# Patient Record
Sex: MALE | Race: WHITE | NOT HISPANIC OR LATINO | Employment: UNEMPLOYED | URBAN - NONMETROPOLITAN AREA
[De-identification: names, ages, dates, MRNs, and addresses within clinical notes are randomized per-mention and may not be internally consistent; named-entity substitution may affect disease eponyms.]

---

## 2018-07-16 ENCOUNTER — OFFICE VISIT (OUTPATIENT)
Dept: ORTHOPEDIC SURGERY | Facility: CLINIC | Age: 28
End: 2018-07-16

## 2018-07-16 VITALS — HEIGHT: 68 IN | WEIGHT: 160 LBS | BODY MASS INDEX: 24.25 KG/M2

## 2018-07-16 DIAGNOSIS — S43.432A LABRAL TEAR OF SHOULDER, LEFT, INITIAL ENCOUNTER: ICD-10-CM

## 2018-07-16 DIAGNOSIS — M25.512 LEFT SHOULDER PAIN, UNSPECIFIED CHRONICITY: Primary | ICD-10-CM

## 2018-07-16 PROCEDURE — 99203 OFFICE O/P NEW LOW 30 MIN: CPT | Performed by: ORTHOPAEDIC SURGERY
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
## 2018-07-18 DIAGNOSIS — M25.512 ACUTE PAIN OF LEFT SHOULDER: Primary | ICD-10-CM

## 2018-07-18 DIAGNOSIS — S43.432A TEAR OF LEFT GLENOID LABRUM, INITIAL ENCOUNTER: ICD-10-CM

## 2018-07-30 ENCOUNTER — OFFICE VISIT (OUTPATIENT)
Dept: ORTHOPEDIC SURGERY | Facility: CLINIC | Age: 28
End: 2018-07-30

## 2018-07-30 VITALS — WEIGHT: 160 LBS | BODY MASS INDEX: 24.25 KG/M2 | HEIGHT: 68 IN

## 2018-07-30 DIAGNOSIS — M25.512 LEFT SHOULDER PAIN, UNSPECIFIED CHRONICITY: ICD-10-CM

## 2018-07-30 DIAGNOSIS — S43.432A LABRAL TEAR OF SHOULDER, LEFT, INITIAL ENCOUNTER: Primary | ICD-10-CM

## 2018-07-30 PROCEDURE — 99213 OFFICE O/P EST LOW 20 MIN: CPT | Performed by: ORTHOPAEDIC SURGERY

## 2018-07-30 NOTE — PROGRESS NOTES
Swapnil Kyle is a 27 y.o. male returns for     Chief Complaint   Patient presents with   • Left Shoulder - Follow-up       HISTORY OF PRESENT ILLNESS: Patient is here today for left shoulder follow up. He is having some physical therapy still reports the same symptoms reports that he is no better than he was.       CONCURRENT MEDICAL HISTORY:    Past Medical History:   Diagnosis Date   • Acute pharyngitis    • Allergic rhinitis    • Allergic rhinitis due to pollen    • Anemia    • Aphthous ulcer of mouth    • Asthma    • Backache    • Common cold    • Cough    • Disorder of skin    • Dysuria    • Exanthematous disorder     numular eczema    • History of regular medication use     long term    • History of respiratory therapy 03/07/2011    Nebulizer Treatment 26523 (4) - M. Hack   • Hyperglycemia    • Hyperuricemia    • Increased frequency of urination    • Malaise and fatigue    • Short stature disorder    • Thrombocytopenic purpura (CMS/HCC)    • Type II diabetes mellitus, uncontrolled (CMS/HCC)        Allergies   Allergen Reactions   • Augmentin [Amoxicillin-Pot Clavulanate]    • Other      PEDIAZOLE   • Sulfa Antibiotics          Current Outpatient Prescriptions:   •  azithromycin (ZITHROMAX) 250 MG tablet, Take  by mouth. Z-Sly:  Take 2 tablets the first day, then 1 tablet daily for 4 days., Disp: , Rfl:   •  dextromethorphan-guaifenesin (ROBITUSSIN-DM)  MG/5ML syrup, Take  by mouth. Take 2 teaspoonfulls every 4 to 6 hours as needed for cough., Disp: , Rfl:   •  ibuprofen (ADVIL,MOTRIN) 800 MG tablet, Take 800 mg by mouth 3 (Three) Times a Day As Needed., Disp: , Rfl:   •  loratadine (CLARITIN) 10 MG tablet, Take 10 mg by mouth Daily., Disp: , Rfl:   •  predniSONE (DELTASONE) 20 MG tablet, Take  by mouth. Take 2 tablets daily in the morning with food., Disp: , Rfl:   •  promethazine-codeine (PHENERGAN with CODEINE) 6.25-10 MG/5ML syrup, Take  by mouth. Take 1 tsp every 6 hours as needed., Disp: , Rfl:  "    Past Surgical History:   Procedure Laterality Date   • EYE SURGERY Bilateral 01/31/1994    Bilaterral nasolacrimal duct probe. Bilateral nasal lacrimal duct obstruction   • MYRINGOTOMY Bilateral 06/23/1992    TUBE IMPLANT GENERAL ANESTHETIC 03305 (1) - Bilateral myringotomies w/ Fernández ventilating tube implants. examination of adenoids   • NOSE SURGERY  02/22/2002    Removal of the nasal packing under general anesthetic & inspection of nasal cavity. Patient has nasal packing that needs to be removed   • OTHER SURGICAL HISTORY  03/07/2011    Biopsy, Each Additional Lesion 37092 (1) - CECILIAOscar Arnulfo   • TONSILLECTOMY AND ADENOIDECTOMY Bilateral 10/19/1993    Bilateral myringotomies with Durivent tube implants. T&A. Chronic recurrent bilateral secretory otitis media. Marked enlargement of the tonsils & adenoids causing infection       ROS  No fevers or chills.  No chest pain or shortness of air.  No GI or  disturbances.    PHYSICAL EXAMINATION:       Ht 172.7 cm (68\")   Wt 72.6 kg (160 lb)   BMI 24.33 kg/m²     Physical Exam   Constitutional: He is oriented to person, place, and time. He appears well-developed and well-nourished.   HENT:   Head: Normocephalic and atraumatic.   Eyes: Pupils are equal, round, and reactive to light. EOM are normal.   Neck: Neck supple. No tracheal deviation present.   Pulmonary/Chest: Effort normal.   Musculoskeletal: Normal range of motion. He exhibits no edema or deformity.   Neurological: He is alert and oriented to person, place, and time.   Skin: Skin is warm and dry. No erythema.   Psychiatric: He has a normal mood and affect. Thought content normal.   Vitals reviewed.      GAIT:     [x]  Normal  []  Antalgic    Assistive device: [x]  None  []  Walker     []  Crutches  []  Cane     []  Wheelchair  []  Stretcher    Ortho Exam   Passive motion well maintained.  Neurovascularly intact.  Impingement is negative.    No results found.          ASSESSMENT:    Diagnoses and all orders " for this visit:    Labral tear of shoulder, left, initial encounter  -     MRI Shoulder Left Arthrogram; Future    Left shoulder pain, unspecified chronicity  -     MRI Shoulder Left Arthrogram; Future          PLAN at this point I think he needs MRI arthrogram to rule out labral tear.  Certainly if he has a labral tear documented I think this is something that'll likely needs fixed.  He has not improved at this point.    No Follow-up on file.    Lawrence Serrano MD

## 2018-08-17 ENCOUNTER — APPOINTMENT (OUTPATIENT)
Dept: MRI IMAGING | Facility: HOSPITAL | Age: 28
End: 2018-08-17
Attending: ORTHOPAEDIC SURGERY

## 2018-08-20 ENCOUNTER — TRANSCRIBE ORDERS (OUTPATIENT)
Dept: GENERAL RADIOLOGY | Facility: HOSPITAL | Age: 28
End: 2018-08-20

## 2018-08-20 ENCOUNTER — APPOINTMENT (OUTPATIENT)
Dept: MRI IMAGING | Facility: HOSPITAL | Age: 28
End: 2018-08-20
Attending: ORTHOPAEDIC SURGERY

## 2018-08-20 ENCOUNTER — HOSPITAL ENCOUNTER (OUTPATIENT)
Dept: MRI IMAGING | Facility: HOSPITAL | Age: 28
End: 2018-08-20
Attending: ORTHOPAEDIC SURGERY

## 2018-08-20 ENCOUNTER — OFFICE VISIT (OUTPATIENT)
Dept: ORTHOPEDIC SURGERY | Facility: CLINIC | Age: 28
End: 2018-08-20

## 2018-08-20 VITALS — HEIGHT: 68 IN | WEIGHT: 160 LBS | BODY MASS INDEX: 24.25 KG/M2

## 2018-08-20 DIAGNOSIS — S43.432A LABRAL TEAR OF SHOULDER, LEFT, INITIAL ENCOUNTER: Primary | ICD-10-CM

## 2018-08-20 DIAGNOSIS — M25.512 LEFT SHOULDER PAIN, UNSPECIFIED CHRONICITY: ICD-10-CM

## 2018-08-20 DIAGNOSIS — S43.432D LABRAL TEAR OF SHOULDER, LEFT, SUBSEQUENT ENCOUNTER: Primary | ICD-10-CM

## 2018-08-20 PROCEDURE — 99214 OFFICE O/P EST MOD 30 MIN: CPT | Performed by: ORTHOPAEDIC SURGERY

## 2018-08-20 RX ORDER — CLINDAMYCIN PHOSPHATE 900 MG/50ML
900 INJECTION INTRAVENOUS ONCE
Status: CANCELLED | OUTPATIENT
Start: 2018-08-30 | End: 2018-08-30

## 2018-08-20 NOTE — PROGRESS NOTES
"Swapnil Kyle is a 27 y.o. male returns for     Chief Complaint   Patient presents with   • Left Shoulder - Follow-up       HISTORY OF PRESENT ILLNESS: Patient is here today for recheck of left shoulder pain.  He still having pain his arthrogram was denied.  Still having symptoms of instability and feels like something is trapped in his shoulder.  It's been going on now over a year despite well-managed physical therapy program.  His previous MRI scan did show some undercutting of the anterior superior labrum which is why suspect his problem is clinically.     CONCURRENT MEDICAL HISTORY:    The following portions of the patient's history were reviewed and updated as appropriate: allergies, current medications, past family history, past medical history, past social history, past surgical history and problem list.     ROS  No fevers or chills.  No chest pain or shortness of air.  No GI or  disturbances.    PHYSICAL EXAMINATION:       Ht 172.7 cm (68\")   Wt 72.6 kg (160 lb)   BMI 24.33 kg/m²     Physical Exam   Constitutional: He is oriented to person, place, and time. He appears well-developed and well-nourished.   HENT:   Head: Normocephalic and atraumatic.   Eyes: Pupils are equal, round, and reactive to light. EOM are normal.   Neck: Neck supple. No tracheal deviation present.   Pulmonary/Chest: Effort normal.   Musculoskeletal: He exhibits tenderness. He exhibits no edema or deformity.   Neurological: He is alert and oriented to person, place, and time.   Skin: Skin is warm and dry. No erythema.   Psychiatric: He has a normal mood and affect. Thought content normal.   Vitals reviewed.      GAIT:     [x]  Normal  []  Antalgic    Assistive device: [x]  None  []  Walker     []  Crutches  []  Cane     []  Wheelchair  []  Stretcher    Ortho Exam   Passive range of motion fairly symmetric.  Pain with external rotation.  Apprehension sign is positive.  Really minimally positive management.  Good strength of the " infraspinatus, supraspinatus and subscap.  Tender anteriorly on the glenohumeral joint deeply.  Minimal tenderness over the biceps.    No results found.          ASSESSMENT:    Diagnoses and all orders for this visit:    Labral tear of shoulder, left, initial encounter    Left shoulder pain, unspecified chronicity          PLAN he has clinical instability and likely subluxation and perhaps mobility from a labral tear.  I spent quite a lot of a discussing labral pathology.  27-year-old with over a year pain despite well-managed clinical progress.  Would consider diagnostic arthroscopy with labral repair as indicated..  I've also discussed with them the labral tear where the biceps is pulled off from the labrum and that may require a tenodesis of the biceps.  Risks and benefits were discussed him including but not limited to bleeding, infection, neurovascular injury, failure of the repair, persistent instability, need for further surgery, loss of motion, prolonged recovery, medical anesthetic complications, etc. he stands right proceed as planned.  We also talked about potential for biceps tenodesis depending upon whether this tear was.  Then proceed as planned.    No Follow-up on file.    Vandana Ortega MA

## 2018-08-22 ENCOUNTER — APPOINTMENT (OUTPATIENT)
Dept: MRI IMAGING | Facility: HOSPITAL | Age: 28
End: 2018-08-22

## 2018-08-22 ENCOUNTER — APPOINTMENT (OUTPATIENT)
Dept: CT IMAGING | Facility: HOSPITAL | Age: 28
End: 2018-08-22

## 2018-08-27 ENCOUNTER — APPOINTMENT (OUTPATIENT)
Dept: PREADMISSION TESTING | Facility: HOSPITAL | Age: 28
End: 2018-08-27

## 2018-08-27 LAB
ANION GAP SERPL CALCULATED.3IONS-SCNC: 12 MMOL/L (ref 5–15)
BUN BLD-MCNC: 17 MG/DL (ref 7–21)
BUN/CREAT SERPL: 18.5 (ref 7–25)
CALCIUM SPEC-SCNC: 10.1 MG/DL (ref 8.4–10.2)
CHLORIDE SERPL-SCNC: 101 MMOL/L (ref 95–110)
CO2 SERPL-SCNC: 25 MMOL/L (ref 22–31)
CREAT BLD-MCNC: 0.92 MG/DL (ref 0.7–1.3)
DEPRECATED RDW RBC AUTO: 43 FL (ref 35.1–43.9)
ERYTHROCYTE [DISTWIDTH] IN BLOOD BY AUTOMATED COUNT: 14.4 % (ref 11.5–14.5)
GFR SERPL CREATININE-BSD FRML MDRD: 99 ML/MIN/1.73 (ref 77–179)
GLUCOSE BLD-MCNC: 73 MG/DL (ref 60–100)
HCT VFR BLD AUTO: 45.3 % (ref 39–49)
HGB BLD-MCNC: 15 G/DL (ref 13.7–17.3)
MCH RBC QN AUTO: 27.1 PG (ref 26.5–34)
MCHC RBC AUTO-ENTMCNC: 33.1 G/DL (ref 31.5–36.3)
MCV RBC AUTO: 81.9 FL (ref 80–98)
PLATELET # BLD AUTO: 390 10*3/MM3 (ref 150–450)
PMV BLD AUTO: 12.2 FL (ref 8–12)
POTASSIUM BLD-SCNC: 4.6 MMOL/L (ref 3.5–5.1)
RBC # BLD AUTO: 5.53 10*6/MM3 (ref 4.37–5.74)
SODIUM BLD-SCNC: 138 MMOL/L (ref 137–145)
WBC NRBC COR # BLD: 12.49 10*3/MM3 (ref 3.2–9.8)

## 2018-08-27 PROCEDURE — 36415 COLL VENOUS BLD VENIPUNCTURE: CPT

## 2018-08-27 PROCEDURE — 85027 COMPLETE CBC AUTOMATED: CPT | Performed by: ANESTHESIOLOGY

## 2018-08-27 PROCEDURE — 80048 BASIC METABOLIC PNL TOTAL CA: CPT | Performed by: ANESTHESIOLOGY

## 2018-08-27 PROCEDURE — 93010 ELECTROCARDIOGRAM REPORT: CPT | Performed by: INTERNAL MEDICINE

## 2018-08-27 PROCEDURE — 93005 ELECTROCARDIOGRAM TRACING: CPT

## 2018-08-27 RX ORDER — SODIUM CHLORIDE 9 MG/ML
1000 INJECTION, SOLUTION INTRAVENOUS CONTINUOUS
Status: CANCELLED | OUTPATIENT
Start: 2018-08-30

## 2018-08-27 NOTE — DISCHARGE INSTRUCTIONS
[facility]  Pre-op Information and Guidelines    You will be called after 2 p.m. the day before your surgery (Friday for Monday surgery) and notified of your time for arrival and approximate surgery time.  If you have not received a call by 4P.M., please contact Same Day Surgery at [phone] of if outside [facility] call [phone].    Please Follow these Important Safety Guidelines:    • The morning of your procedure, take only the medications listed below with   A sip of water:_____________________________________________       ______________________________________________    • DO NOT eat or drink anything after 12:00 midnight the night before surgery  Specific instructions concerning drinking clear liquids will be discussed during  the pre-surgery instruction call the day before your surgery.    • If you take a blood thinner (ex. Plavix, Coumadin, aspirin), ask your doctor when to stop it before surgery  STOP DATE: _________________    • Only 2 visitors are allowed in patient rooms at a time  Your visitors will be asked to wait in the lobby until the admission process is complete with the exception of a parent with a child and patients in need of special assistance.    • YOU CANNOT DRIVE YOURSELF HOME  You must be accompanied by someone who will be responsible for driving you home after surgery and for your care at home.    • DO NOT chew gum, use breath mints, hard candy, or smoke the day of surgery  • DO NOT drink alcohol for at least 24 hours before your surgery  • DO NOT wear any jewelry and remove all body piercing before coming to the hospital  • DO NOT wear make-up to the hospital  • If you are having surgery on an extremity (arm/leg/foot) remove nail polish/artificial nails on the surgical side  • Clothing, glasses, contacts, dentures, and hairpieces must be removed before surgery  • Bathe the night before or the morning of your surgery and do not use powders/lotions on  skin.

## 2018-08-30 ENCOUNTER — ANESTHESIA EVENT (OUTPATIENT)
Dept: PERIOP | Facility: HOSPITAL | Age: 28
End: 2018-08-30

## 2018-08-30 ENCOUNTER — HOSPITAL ENCOUNTER (OUTPATIENT)
Facility: HOSPITAL | Age: 28
Setting detail: HOSPITAL OUTPATIENT SURGERY
Discharge: HOME | End: 2018-08-30
Attending: ORTHOPAEDIC SURGERY | Admitting: ANESTHESIOLOGY

## 2018-08-30 ENCOUNTER — ANESTHESIA (OUTPATIENT)
Dept: PERIOP | Facility: HOSPITAL | Age: 28
End: 2018-08-30

## 2018-08-30 VITALS
WEIGHT: 157.63 LBS | HEART RATE: 75 BPM | BODY MASS INDEX: 23.89 KG/M2 | OXYGEN SATURATION: 99 % | TEMPERATURE: 97.3 F | DIASTOLIC BLOOD PRESSURE: 60 MMHG | RESPIRATION RATE: 20 BRPM | SYSTOLIC BLOOD PRESSURE: 130 MMHG | HEIGHT: 68 IN

## 2018-08-30 DIAGNOSIS — S43.432A LABRAL TEAR OF SHOULDER, LEFT, INITIAL ENCOUNTER: ICD-10-CM

## 2018-08-30 LAB
DEPRECATED RDW RBC AUTO: 42 FL (ref 35.1–43.9)
EOSINOPHIL # BLD MANUAL: 0.85 10*3/MM3 (ref 0–0.7)
EOSINOPHIL NFR BLD MANUAL: 7 % (ref 0–7)
ERYTHROCYTE [DISTWIDTH] IN BLOOD BY AUTOMATED COUNT: 14.2 % (ref 11.5–14.5)
GLUCOSE BLDC GLUCOMTR-MCNC: 93 MG/DL (ref 70–130)
HCT VFR BLD AUTO: 42.9 % (ref 39–49)
HGB BLD-MCNC: 14.3 G/DL (ref 13.7–17.3)
LYMPHOCYTES # BLD MANUAL: 5.98 10*3/MM3 (ref 0.6–4.2)
LYMPHOCYTES NFR BLD MANUAL: 49 % (ref 10–50)
LYMPHOCYTES NFR BLD MANUAL: 8 % (ref 0–12)
MCH RBC QN AUTO: 27 PG (ref 26.5–34)
MCHC RBC AUTO-ENTMCNC: 33.3 G/DL (ref 31.5–36.3)
MCV RBC AUTO: 81.1 FL (ref 80–98)
MONOCYTES # BLD AUTO: 0.98 10*3/MM3 (ref 0–0.9)
NEUTROPHILS # BLD AUTO: 3.79 10*3/MM3 (ref 2–8.6)
NEUTROPHILS NFR BLD MANUAL: 31 % (ref 37–80)
PLATELET # BLD AUTO: 357 10*3/MM3 (ref 150–450)
PMV BLD AUTO: 11.2 FL (ref 8–12)
RBC # BLD AUTO: 5.29 10*6/MM3 (ref 4.37–5.74)
RBC MORPH BLD: NORMAL
SMALL PLATELETS BLD QL SMEAR: ADEQUATE
VARIANT LYMPHS NFR BLD MANUAL: 5 % (ref 0–5)
WBC MORPH BLD: NORMAL
WBC NRBC COR # BLD: 12.21 10*3/MM3 (ref 3.2–9.8)

## 2018-08-30 PROCEDURE — C1713 ANCHOR/SCREW BN/BN,TIS/BN: HCPCS | Performed by: ORTHOPAEDIC SURGERY

## 2018-08-30 PROCEDURE — 25010000002 NEOSTIGMINE 4 MG/4ML SOLUTION PREFILLED SYRINGE: Performed by: NURSE ANESTHETIST, CERTIFIED REGISTERED

## 2018-08-30 PROCEDURE — 25010000002 PROPOFOL 10 MG/ML EMULSION: Performed by: NURSE ANESTHETIST, CERTIFIED REGISTERED

## 2018-08-30 PROCEDURE — 85007 BL SMEAR W/DIFF WBC COUNT: CPT | Performed by: ORTHOPAEDIC SURGERY

## 2018-08-30 PROCEDURE — 82962 GLUCOSE BLOOD TEST: CPT

## 2018-08-30 PROCEDURE — L3670 SO ACRO/CLAV CAN WEB PRE OTS: HCPCS | Performed by: ORTHOPAEDIC SURGERY

## 2018-08-30 PROCEDURE — 25010000002 HYDROMORPHONE PER 4 MG: Performed by: NURSE ANESTHETIST, CERTIFIED REGISTERED

## 2018-08-30 PROCEDURE — 25010000002 ROPIVACAINE PER 1 MG: Performed by: NURSE ANESTHETIST, CERTIFIED REGISTERED

## 2018-08-30 PROCEDURE — 29806 SHO ARTHRS SRG CAPSULORRAPHY: CPT | Performed by: ORTHOPAEDIC SURGERY

## 2018-08-30 PROCEDURE — 25010000002 DEXAMETHASONE PER 1 MG: Performed by: NURSE ANESTHETIST, CERTIFIED REGISTERED

## 2018-08-30 PROCEDURE — 25010000002 EPINEPHRINE PER 0.1 MG: Performed by: ORTHOPAEDIC SURGERY

## 2018-08-30 PROCEDURE — 25010000002 MIDAZOLAM PER 1 MG: Performed by: NURSE ANESTHETIST, CERTIFIED REGISTERED

## 2018-08-30 PROCEDURE — 85025 COMPLETE CBC W/AUTO DIFF WBC: CPT | Performed by: ORTHOPAEDIC SURGERY

## 2018-08-30 PROCEDURE — 25010000002 ONDANSETRON PER 1 MG: Performed by: NURSE ANESTHETIST, CERTIFIED REGISTERED

## 2018-08-30 PROCEDURE — 25010000002 FENTANYL CITRATE (PF) 100 MCG/2ML SOLUTION: Performed by: NURSE ANESTHETIST, CERTIFIED REGISTERED

## 2018-08-30 DEVICE — SUT/ANCH BIOCOMP PUSH/LK 2.9X12.5MM: Type: IMPLANTABLE DEVICE | Site: SHOULDER | Status: FUNCTIONAL

## 2018-08-30 RX ORDER — DIPHENHYDRAMINE HYDROCHLORIDE 50 MG/ML
12.5 INJECTION INTRAMUSCULAR; INTRAVENOUS
Status: DISCONTINUED | OUTPATIENT
Start: 2018-08-30 | End: 2018-08-30 | Stop reason: HOSPADM

## 2018-08-30 RX ORDER — ACETAMINOPHEN 650 MG/1
650 SUPPOSITORY RECTAL ONCE AS NEEDED
Status: DISCONTINUED | OUTPATIENT
Start: 2018-08-30 | End: 2018-08-30 | Stop reason: HOSPADM

## 2018-08-30 RX ORDER — ONDANSETRON 2 MG/ML
INJECTION INTRAMUSCULAR; INTRAVENOUS AS NEEDED
Status: DISCONTINUED | OUTPATIENT
Start: 2018-08-30 | End: 2018-08-30 | Stop reason: SURG

## 2018-08-30 RX ORDER — FENTANYL CITRATE 50 UG/ML
INJECTION, SOLUTION INTRAMUSCULAR; INTRAVENOUS AS NEEDED
Status: DISCONTINUED | OUTPATIENT
Start: 2018-08-30 | End: 2018-08-30 | Stop reason: SURG

## 2018-08-30 RX ORDER — ROPIVACAINE HYDROCHLORIDE 5 MG/ML
INJECTION, SOLUTION EPIDURAL; INFILTRATION; PERINEURAL AS NEEDED
Status: DISCONTINUED | OUTPATIENT
Start: 2018-08-30 | End: 2018-08-30 | Stop reason: SURG

## 2018-08-30 RX ORDER — NEOSTIGMINE METHYLSULFATE 4 MG/4 ML
SYRINGE (ML) INTRAVENOUS AS NEEDED
Status: DISCONTINUED | OUTPATIENT
Start: 2018-08-30 | End: 2018-08-30 | Stop reason: SURG

## 2018-08-30 RX ORDER — ROCURONIUM BROMIDE 10 MG/ML
INJECTION, SOLUTION INTRAVENOUS AS NEEDED
Status: DISCONTINUED | OUTPATIENT
Start: 2018-08-30 | End: 2018-08-30 | Stop reason: SURG

## 2018-08-30 RX ORDER — MIDAZOLAM HYDROCHLORIDE 1 MG/ML
INJECTION INTRAMUSCULAR; INTRAVENOUS AS NEEDED
Status: DISCONTINUED | OUTPATIENT
Start: 2018-08-30 | End: 2018-08-30 | Stop reason: SURG

## 2018-08-30 RX ORDER — GLYCOPYRROLATE 0.2 MG/ML
INJECTION INTRAMUSCULAR; INTRAVENOUS AS NEEDED
Status: DISCONTINUED | OUTPATIENT
Start: 2018-08-30 | End: 2018-08-30 | Stop reason: SURG

## 2018-08-30 RX ORDER — OXYCODONE AND ACETAMINOPHEN 7.5; 325 MG/1; MG/1
1-2 TABLET ORAL EVERY 4 HOURS PRN
Qty: 30 TABLET | Refills: 0 | Status: SHIPPED | OUTPATIENT
Start: 2018-08-30 | End: 2018-10-15 | Stop reason: ALTCHOICE

## 2018-08-30 RX ORDER — EPINEPHRINE 1 MG/ML
INJECTION, SOLUTION, CONCENTRATE INTRAVENOUS AS NEEDED
Status: DISCONTINUED | OUTPATIENT
Start: 2018-08-30 | End: 2018-08-30 | Stop reason: HOSPADM

## 2018-08-30 RX ORDER — DEXAMETHASONE SODIUM PHOSPHATE 4 MG/ML
INJECTION, SOLUTION INTRA-ARTICULAR; INTRALESIONAL; INTRAMUSCULAR; INTRAVENOUS; SOFT TISSUE AS NEEDED
Status: DISCONTINUED | OUTPATIENT
Start: 2018-08-30 | End: 2018-08-30 | Stop reason: SURG

## 2018-08-30 RX ORDER — EPHEDRINE SULFATE 50 MG/ML
5 INJECTION, SOLUTION INTRAVENOUS ONCE AS NEEDED
Status: DISCONTINUED | OUTPATIENT
Start: 2018-08-30 | End: 2018-08-30 | Stop reason: HOSPADM

## 2018-08-30 RX ORDER — NALOXONE HCL 0.4 MG/ML
0.2 VIAL (ML) INJECTION AS NEEDED
Status: DISCONTINUED | OUTPATIENT
Start: 2018-08-30 | End: 2018-08-30 | Stop reason: HOSPADM

## 2018-08-30 RX ORDER — LABETALOL HYDROCHLORIDE 5 MG/ML
5 INJECTION, SOLUTION INTRAVENOUS
Status: DISCONTINUED | OUTPATIENT
Start: 2018-08-30 | End: 2018-08-30 | Stop reason: HOSPADM

## 2018-08-30 RX ORDER — PROPOFOL 10 MG/ML
VIAL (ML) INTRAVENOUS AS NEEDED
Status: DISCONTINUED | OUTPATIENT
Start: 2018-08-30 | End: 2018-08-30 | Stop reason: SURG

## 2018-08-30 RX ORDER — SODIUM CHLORIDE 9 MG/ML
1000 INJECTION, SOLUTION INTRAVENOUS CONTINUOUS
Status: DISCONTINUED | OUTPATIENT
Start: 2018-08-30 | End: 2018-08-30 | Stop reason: HOSPADM

## 2018-08-30 RX ORDER — MEPERIDINE HYDROCHLORIDE 50 MG/ML
12.5 INJECTION INTRAMUSCULAR; INTRAVENOUS; SUBCUTANEOUS
Status: DISCONTINUED | OUTPATIENT
Start: 2018-08-30 | End: 2018-08-30 | Stop reason: HOSPADM

## 2018-08-30 RX ORDER — FLUMAZENIL 0.1 MG/ML
0.2 INJECTION INTRAVENOUS AS NEEDED
Status: DISCONTINUED | OUTPATIENT
Start: 2018-08-30 | End: 2018-08-30 | Stop reason: HOSPADM

## 2018-08-30 RX ORDER — ONDANSETRON 2 MG/ML
4 INJECTION INTRAMUSCULAR; INTRAVENOUS ONCE AS NEEDED
Status: DISCONTINUED | OUTPATIENT
Start: 2018-08-30 | End: 2018-08-30 | Stop reason: HOSPADM

## 2018-08-30 RX ORDER — CLINDAMYCIN PHOSPHATE 900 MG/50ML
900 INJECTION INTRAVENOUS ONCE
Status: COMPLETED | OUTPATIENT
Start: 2018-08-30 | End: 2018-08-30

## 2018-08-30 RX ORDER — ACETAMINOPHEN 325 MG/1
650 TABLET ORAL ONCE AS NEEDED
Status: DISCONTINUED | OUTPATIENT
Start: 2018-08-30 | End: 2018-08-30 | Stop reason: HOSPADM

## 2018-08-30 RX ADMIN — FENTANYL CITRATE 50 MCG: 50 INJECTION, SOLUTION INTRAMUSCULAR; INTRAVENOUS at 14:33

## 2018-08-30 RX ADMIN — ONDANSETRON 4 MG: 2 INJECTION INTRAMUSCULAR; INTRAVENOUS at 14:32

## 2018-08-30 RX ADMIN — MIDAZOLAM 2 MG: 1 INJECTION INTRAMUSCULAR; INTRAVENOUS at 12:42

## 2018-08-30 RX ADMIN — GLYCOPYRROLATE 0.5 MG: 0.2 INJECTION, SOLUTION INTRAMUSCULAR; INTRAVENOUS at 14:52

## 2018-08-30 RX ADMIN — FENTANYL CITRATE 100 MCG: 50 INJECTION, SOLUTION INTRAMUSCULAR; INTRAVENOUS at 12:46

## 2018-08-30 RX ADMIN — PROPOFOL 200 MG: 10 INJECTION, EMULSION INTRAVENOUS at 13:10

## 2018-08-30 RX ADMIN — DEXAMETHASONE SODIUM PHOSPHATE 4 MG: 4 INJECTION, SOLUTION INTRAMUSCULAR; INTRAVENOUS at 14:32

## 2018-08-30 RX ADMIN — HYDROMORPHONE HYDROCHLORIDE 0.5 MG: 1 INJECTION, SOLUTION INTRAMUSCULAR; INTRAVENOUS; SUBCUTANEOUS at 15:14

## 2018-08-30 RX ADMIN — HYDROMORPHONE HYDROCHLORIDE 0.5 MG: 1 INJECTION, SOLUTION INTRAMUSCULAR; INTRAVENOUS; SUBCUTANEOUS at 15:05

## 2018-08-30 RX ADMIN — Medication 2 MG: at 14:52

## 2018-08-30 RX ADMIN — ROCURONIUM BROMIDE 50 MG: 10 INJECTION INTRAVENOUS at 13:10

## 2018-08-30 RX ADMIN — ROCURONIUM BROMIDE 10 MG: 10 INJECTION INTRAVENOUS at 14:32

## 2018-08-30 RX ADMIN — CLINDAMYCIN IN 5 PERCENT DEXTROSE 900 MG: 18 INJECTION, SOLUTION INTRAVENOUS at 12:48

## 2018-08-30 RX ADMIN — ROPIVACAINE HYDROCHLORIDE 30 ML: 5 INJECTION, SOLUTION EPIDURAL; INFILTRATION; PERINEURAL at 13:00

## 2018-08-30 RX ADMIN — MIDAZOLAM 2 MG: 1 INJECTION INTRAMUSCULAR; INTRAVENOUS at 12:49

## 2018-08-30 RX ADMIN — SODIUM CHLORIDE 1000 ML: 9 INJECTION, SOLUTION INTRAVENOUS at 10:45

## 2018-08-30 NOTE — ANESTHESIA PROCEDURE NOTES
Peripheral Block    Patient location during procedure: OR  Start time: 8/30/2018 12:48 PM  Stop time: 8/30/2018 1:00 PM  Reason for block: at surgeon's request and post-op pain management  Performed by  Anesthesiologist: JOSE R THORNE  CRNA: JOANIE BRICENO  Preanesthetic Checklist  Completed: patient identified, site marked, surgical consent, pre-op evaluation, timeout performed, IV checked, risks and benefits discussed and monitors and equipment checked  Prep:  Pt Position: supine  Sterile barriers:cap, gloves and mask  Prep: ChloraPrep  Patient monitoring: blood pressure monitoring, continuous pulse oximetry and EKG  Procedure  Sedation:yes  Performed under: PNB  Guidance:ultrasound guided  ULTRASOUND INTERPRETATION.  Using ultrasound guidance a 21 G gauge needle was placed in close proximity to the brachial plexus nerve, at which point, under ultrasound guidance anesthetic was injected in the area of the nerve and spread of the anesthesia was seen on ultrasound in close proximity thereto.  There were no abnormalities seen on ultrasound; a digital image was taken; and the patient tolerated the procedure with no complications. Images:still images obtained    Laterality:left  Block Type:interscalene  Injection Technique:single-shot  Needle Type:short-bevel  Needle Gauge:21 G  Resistance on Injection: none  Medications  Local Injected:ropivacaine 0.5% Local Amount Injected:30mL  Post Assessment  Injection Assessment: negative aspiration for heme, no paresthesia on injection and incremental injection  Patient Tolerance:comfortable throughout block  Complications:no

## 2018-08-30 NOTE — ANESTHESIA PROCEDURE NOTES
Airway  Urgency: elective    Date/Time: 8/30/2018 1:10 PM  End Time:8/30/2018 1:10 PM  Airway not difficult    General Information and Staff    Patient location during procedure: OR  CRNA: JOANIE BRICENO    Indications and Patient Condition  Indications for airway management: airway protection    Preoxygenated: yes  Mask difficulty assessment: 1 - vent by mask    Final Airway Details  Final airway type: endotracheal airway      Successful airway: ETT  Cuffed: yes   Successful intubation technique: direct laryngoscopy  Facilitating devices/methods: intubating stylet  Endotracheal tube insertion site: oral  Blade: Mariposa  Blade size: 3  ETT size: 8.0 mm  Cormack-Lehane Classification: grade I - full view of glottis  Placement verified by: chest auscultation and capnometry   Cuff volume (mL): 8  Measured from: lips  ETT to lips (cm): 21  Number of attempts at approach: 1

## 2018-08-30 NOTE — ANESTHESIA POSTPROCEDURE EVALUATION
Patient: Swapnil Flores Stone    Procedure Summary     Date:  08/30/18 Room / Location:  [facility] OR [address] [place] [address] OR    Anesthesia Start:  1245 Anesthesia Stop:  1501    Procedure:  DIAGNOSTIC ARTHROSCOPY LEFT SHOULDER WITH LABRAL REPAIR AS INDICATED (Left Shoulder) Diagnosis:       Labral tear of shoulder, left, initial encounter      (Labral tear of shoulder, left, initial encounter [S43.432A])    Surgeon:  Lawrence Serrano MD Provider:  Chelsea Gutierrez CRNA    Anesthesia Type:  general ASA Status:  2          Anesthesia Type: general  Last vitals  BP   125/69 (08/30/18 1037)   Temp   98.6 °F (37 °C) (08/30/18 1037)   Pulse   68 (08/30/18 1037)   Resp   18 (08/30/18 1037)     SpO2   96 % (08/30/18 1037)     Post Anesthesia Care and Evaluation    Patient location during evaluation: PACU  Patient participation: complete - patient participated  Level of consciousness: awake and alert  Pain score: 2  Pain management: adequate  Airway patency: patent  Anesthetic complications: No anesthetic complications    Cardiovascular status: acceptable  Respiratory status: acceptable  Hydration status: acceptable

## 2018-08-30 NOTE — ANESTHESIA PREPROCEDURE EVALUATION
Anesthesia Evaluation     Patient summary reviewed and Nursing notes reviewed   no history of anesthetic complications:  NPO Solid Status: > 8 hours  NPO Liquid Status: > 8 hours           Airway   Mallampati: I  TM distance: >3 FB  Neck ROM: full  possible difficult intubation  Dental - normal exam     Pulmonary - normal exam    breath sounds clear to auscultation  (+) asthma, recent URI resolved,   (-) not a smoker  Cardiovascular - negative cardio ROS and normal exam    ECG reviewed  Rhythm: regular  Rate: normal    (-) murmur    ROS comment: Sinus bradycardia with marked sinus arrhythmia  Otherwise normal ECG Early repolarization  No previous ECGs available  Confirmed by SUNNI SANTANA, B. N. ([id number]),  YENI ALONZO ([id number]) on  8/28/2018 3:17:25 PM    Neuro/Psych- negative ROS  GI/Hepatic/Renal/Endo    (+)   diabetes mellitus (No meds; glucose 73. 8/27/18) type 2,     Musculoskeletal (-) negative ROS    Abdominal    Substance History - negative use     OB/GYN negative ob/gyn ROS         Other - negative ROS                       Anesthesia Plan    ASA 2     general   (Discussed peripheral nerve block(interscalene) for post op pain relief and patient understands possible complications,risks and agrees.)  intravenous induction   Anesthetic plan and risks discussed with patient.

## 2018-08-31 DIAGNOSIS — S43.432A LABRAL TEAR OF SHOULDER, LEFT, INITIAL ENCOUNTER: Primary | ICD-10-CM

## 2018-08-31 RX ORDER — ONDANSETRON 4 MG/1
4 TABLET, ORALLY DISINTEGRATING ORAL EVERY 6 HOURS PRN
Status: DISCONTINUED | OUTPATIENT
Start: 2018-08-31 | End: 2018-11-12

## 2018-08-31 RX ORDER — HYDROCODONE BITARTRATE AND ACETAMINOPHEN 7.5; 325 MG/1; MG/1
1 TABLET ORAL EVERY 6 HOURS PRN
Qty: 30 TABLET | Refills: 0 | Status: SHIPPED | OUTPATIENT
Start: 2018-08-31 | End: 2018-09-12 | Stop reason: SDUPTHER

## 2018-09-12 ENCOUNTER — OFFICE VISIT (OUTPATIENT)
Dept: ORTHOPEDIC SURGERY | Facility: CLINIC | Age: 28
End: 2018-09-12

## 2018-09-12 VITALS — HEIGHT: 68 IN | WEIGHT: 157 LBS | BODY MASS INDEX: 23.79 KG/M2

## 2018-09-12 DIAGNOSIS — S43.432A LABRAL TEAR OF SHOULDER, LEFT, INITIAL ENCOUNTER: ICD-10-CM

## 2018-09-12 DIAGNOSIS — M25.512 LEFT SHOULDER PAIN, UNSPECIFIED CHRONICITY: Primary | ICD-10-CM

## 2018-09-12 PROCEDURE — 99024 POSTOP FOLLOW-UP VISIT: CPT | Performed by: ORTHOPAEDIC SURGERY

## 2018-09-12 RX ORDER — HYDROCODONE BITARTRATE AND ACETAMINOPHEN 7.5; 325 MG/1; MG/1
1 TABLET ORAL EVERY 6 HOURS PRN
Qty: 30 TABLET | Refills: 0 | Status: SHIPPED | OUTPATIENT
Start: 2018-09-12 | End: 2018-09-26 | Stop reason: SDUPTHER

## 2018-09-12 NOTE — PROGRESS NOTES
Swapnil Kyle is a 27 y.o. male is s/p       Chief Complaint   Patient presents with   • Post-op     Left shoulder       HISTORY OF PRESENT ILLNESS: Patient is here today s/p diagnostic arthroscopy of the left shoulder with a labral repair on 8/30/2018.  Doing pretty well with expected pain.       Allergies   Allergen Reactions   • Augmentin [Amoxicillin-Pot Clavulanate] Other (See Comments)     unknown   • Erythromycin Ethylsuccinate Unknown (See Comments)     Allergy to Pediazole   • Other Other (See Comments)     PEDIAZOLE   • Codeine Rash   • Sulfa Antibiotics Rash         Current Outpatient Prescriptions:   •  HYDROcodone-acetaminophen (NORCO) 7.5-325 MG per tablet, Take 1 tablet by mouth Every 6 (Six) Hours As Needed for Moderate Pain ., Disp: 30 tablet, Rfl: 0  •  oxyCODONE-acetaminophen (PERCOCET) 7.5-325 MG per tablet, Take 1-2 tablets by mouth Every 4 (Four) Hours As Needed for Pain., Disp: 30 tablet, Rfl: 0    Current Facility-Administered Medications:   •  ondansetron ODT (ZOFRAN-ODT) disintegrating tablet 4 mg, 4 mg, Oral, Q6H PRN, Lawrence Serrano MD    No fevers or chills.  No nausea or vomiting.      PHYSICAL EXAMINATION:       Swapnil Kyle is a 27 y.o. male    Patient is awake and alert, answers questions appropriately and is in no apparent distress.    GAIT:     []  Normal  []  Antalgic    Assistive device: []  None  []  Walker     []  Crutches  []  Cane     []  Wheelchair  []  Stretcher    Ortho Exam  Wounds look good.  He is neurovascularly intact.  Motion is decreased secondary to pain.    No results found.        ASSESSMENT:    Diagnoses and all orders for this visit:    Left shoulder pain, unspecified chronicity    Labral tear of shoulder, left, initial encounter  -     HYDROcodone-acetaminophen (NORCO) 7.5-325 MG per tablet; Take 1 tablet by mouth Every 6 (Six) Hours As Needed for Moderate Pain .          PLAN I've gone over his pictures with him and given the summary.   We'll take his sutures out today.  We've gone over pendulum exercises range of motion of the hand he can feed himself use computer but no stressing of this other than passive range of motion.  We'll reevaluate him in 2-3 weeks consider formal physical therapy at that point.  Also with any problems I will refill his pain medication    No Follow-up on file.    Lawrence Serrano MD

## 2018-09-26 ENCOUNTER — OFFICE VISIT (OUTPATIENT)
Dept: ORTHOPEDIC SURGERY | Facility: CLINIC | Age: 28
End: 2018-09-26

## 2018-09-26 VITALS — HEIGHT: 68 IN | WEIGHT: 157 LBS | BODY MASS INDEX: 23.79 KG/M2

## 2018-09-26 DIAGNOSIS — M25.512 ACUTE PAIN OF LEFT SHOULDER: ICD-10-CM

## 2018-09-26 DIAGNOSIS — S43.432A LABRAL TEAR OF SHOULDER, LEFT, INITIAL ENCOUNTER: ICD-10-CM

## 2018-09-26 DIAGNOSIS — M25.512 LEFT SHOULDER PAIN, UNSPECIFIED CHRONICITY: Primary | ICD-10-CM

## 2018-09-26 PROCEDURE — 99024 POSTOP FOLLOW-UP VISIT: CPT | Performed by: ORTHOPAEDIC SURGERY

## 2018-09-26 RX ORDER — HYDROCODONE BITARTRATE AND ACETAMINOPHEN 7.5; 325 MG/1; MG/1
1 TABLET ORAL EVERY 6 HOURS PRN
Qty: 30 TABLET | Refills: 0 | Status: SHIPPED | OUTPATIENT
Start: 2018-09-26 | End: 2018-11-12

## 2018-09-26 NOTE — PROGRESS NOTES
Swapnil Kyle is a 28 y.o. male is s/p       Chief Complaint   Patient presents with   • Left Shoulder - Follow-up, Post-op       HISTORY OF PRESENT ILLNESS: post op on left shoulder,  Pain is at a 7 today he is just over 3 weeks out.  Still having a fair amount of issue.       Allergies   Allergen Reactions   • Augmentin [Amoxicillin-Pot Clavulanate] Other (See Comments)     unknown   • Erythromycin Ethylsuccinate Unknown (See Comments)     Allergy to Pediazole   • Other Other (See Comments)     PEDIAZOLE   • Codeine Rash   • Sulfa Antibiotics Rash         Current Outpatient Prescriptions:   •  HYDROcodone-acetaminophen (NORCO) 7.5-325 MG per tablet, Take 1 tablet by mouth Every 6 (Six) Hours As Needed for Moderate Pain ., Disp: 30 tablet, Rfl: 0  •  oxyCODONE-acetaminophen (PERCOCET) 7.5-325 MG per tablet, Take 1-2 tablets by mouth Every 4 (Four) Hours As Needed for Pain., Disp: 30 tablet, Rfl: 0    Current Facility-Administered Medications:   •  ondansetron ODT (ZOFRAN-ODT) disintegrating tablet 4 mg, 4 mg, Oral, Q6H PRN, Lawrence Serrano MD    No fevers or chills.  No nausea or vomiting.      PHYSICAL EXAMINATION:       Swapnil Kyle is a 28 y.o. male    Patient is awake and alert, answers questions appropriately and is in no apparent distress.    GAIT:     [x]  Normal  []  Antalgic    Assistive device: []  None  []  Walker     []  Crutches  []  Cane     []  Wheelchair  []  Stretcher    Ortho Exam  Wounds with good.  External rotation about 20° his negative.  He appears neurologically intact.  Has some pain with stretching of the elbow it does come to full extension.    No results found.        ASSESSMENT:    Diagnoses and all orders for this visit:    Left shoulder pain, unspecified chronicity  -     Ambulatory Referral to Physical Therapy Evaluate and treat    Acute pain of left shoulder  -     Ambulatory Referral to Physical Therapy Evaluate and treat    Labral tear of shoulder, left,    -     HYDROcodone-acetaminophen (NORCO) 7.5-325 MG per tablet; Take 1 tablet by mouth Every 6 (Six) Hours As Needed for Moderate Pain .  -     Ambulatory Referral to Physical Therapy Evaluate and treat    Labral tear of shoulder, left, initial encounter  -     HYDROcodone-acetaminophen (NORCO) 7.5-325 MG per tablet; Take 1 tablet by mouth Every 6 (Six) Hours As Needed for Moderate Pain .  -     Ambulatory Referral to Physical Therapy Evaluate and treat          PLAN I spent a good while talking to him today regarding his progress.  Still pretty sore.  However continue his sling have printed out a protocol for labral repair    No Follow-up on file.    Lawrence Serrano MD

## 2018-09-26 NOTE — PROGRESS NOTES
Swapnil Kyle is a 28 y.o. male   Primary provider:  Jyoti Fernandez MD       Chief Complaint   Patient presents with   • Left Shoulder - Follow-up       HISTORY OF PRESENT ILLNESS: f/u on left shoulder,    History of Present Illness     CONCURRENT MEDICAL HISTORY:    Past Medical History:   Diagnosis Date   • Acute pharyngitis    • Allergic rhinitis    • Allergic rhinitis due to pollen    • Anemia    • Aphthous ulcer of mouth    • Asthma     as a child   • Backache    • Common cold    • Cough    • Disorder of skin    • Dysuria    • Exanthematous disorder     numular eczema    • History of ITP    • History of regular medication use     long term    • History of respiratory therapy 03/07/2011    Nebulizer Treatment 89622 (1) - REJI Arredondo   • Hyperglycemia    • Hyperuricemia    • Increased frequency of urination    • Malaise and fatigue    • Short stature disorder    • Thrombocytopenic purpura (CMS/HCC)    • Type II diabetes mellitus, uncontrolled (CMS/HCC)     diet controlled       Allergies   Allergen Reactions   • Augmentin [Amoxicillin-Pot Clavulanate] Other (See Comments)     unknown   • Erythromycin Ethylsuccinate Unknown (See Comments)     Allergy to Pediazole   • Other Other (See Comments)     PEDIAZOLE   • Codeine Rash   • Sulfa Antibiotics Rash         Current Outpatient Prescriptions:   •  HYDROcodone-acetaminophen (NORCO) 7.5-325 MG per tablet, Take 1 tablet by mouth Every 6 (Six) Hours As Needed for Moderate Pain ., Disp: 30 tablet, Rfl: 0  •  oxyCODONE-acetaminophen (PERCOCET) 7.5-325 MG per tablet, Take 1-2 tablets by mouth Every 4 (Four) Hours As Needed for Pain., Disp: 30 tablet, Rfl: 0    Current Facility-Administered Medications:   •  ondansetron ODT (ZOFRAN-ODT) disintegrating tablet 4 mg, 4 mg, Oral, Q6H PRN, Lawrence Serrano MD    Past Surgical History:   Procedure Laterality Date   • MYRINGOTOMY Bilateral 06/23/1992    TUBE IMPLANT GENERAL ANESTHETIC 23478 (1) - Bilateral  myringotomies w/ Fernández ventilating tube implants. examination of adenoids   • NOSE SURGERY  02/22/2002    Removal of the nasal packing under general anesthetic & inspection of nasal cavity. Patient has nasal packing that needs to be removed   • OTHER SURGICAL HISTORY  03/07/2011    Biopsy, Each Additional Lesion 70626 (1) - REJI Arredondo   • SHOULDER ARTHROSCOPY W/ SUPERIOR LABRAL ANTERIOR POSTERIOR REPAIR Left 8/30/2018    Procedure: DIAGNOSTIC ARTHROSCOPY LEFT SHOULDER WITH LABRAL REPAIR AS INDICATED;  Surgeon: Lawrence Serrano MD;  Location: [facility];  Service: Orthopedics   • SPLENECTOMY     • TONSILLECTOMY AND ADENOIDECTOMY Bilateral 10/19/1993    Bilateral myringotomies with Durivent tube implants. T&A. Chronic recurrent bilateral secretory otitis media. Marked enlargement of the tonsils & adenoids causing infection       Family History   Problem Relation Age of Onset   • Cancer Other    • Diabetes Other    • Heart disease Other    • Hypertension Other    • Ulcers Other    • Other Other         Bleeding tendency        Social History     Social History   • Marital status: Single     Spouse name: N/A   • Number of children: N/A   • Years of education: N/A     Occupational History   • Not on file.     Social History Main Topics   • Smoking status: Never Smoker   • Smokeless tobacco: Never Used   • Alcohol use Yes      Comment: occasional   • Drug use: No   • Sexual activity: Defer     Other Topics Concern   • Not on file     Social History Narrative   • No narrative on file        Review of Systems    PHYSICAL EXAMINATION:       There were no vitals taken for this visit.    Physical Exam    GAIT:     [x]  Normal  []  Antalgic    Assistive device: []  None  []  Walker     []  Crutches  []  Cane     []  Wheelchair  []  Stretcher    Ortho Exam      No results found.        ASSESSMENT:    Diagnoses and all orders for this visit:    Left shoulder pain, unspecified chronicity    Acute pain of left  shoulder          PLAN    No Follow-up on file.    Roxane Dockery MA

## 2018-10-15 ENCOUNTER — OFFICE VISIT (OUTPATIENT)
Dept: ORTHOPEDIC SURGERY | Facility: CLINIC | Age: 28
End: 2018-10-15

## 2018-10-15 VITALS — BODY MASS INDEX: 23.79 KG/M2 | HEIGHT: 68 IN | WEIGHT: 157 LBS

## 2018-10-15 DIAGNOSIS — S43.432A LABRAL TEAR OF SHOULDER, LEFT, INITIAL ENCOUNTER: Primary | ICD-10-CM

## 2018-10-15 PROCEDURE — 99024 POSTOP FOLLOW-UP VISIT: CPT | Performed by: ORTHOPAEDIC SURGERY

## 2018-10-15 NOTE — PROGRESS NOTES
Swapnil Kyle is a 28 y.o. male is s/p  Diagnostic arthroscopy with labral repair-left shoulder.      Chief Complaint   Patient presents with   • Left Shoulder - Follow-up       HISTORY OF PRESENT ILLNESS: Patient is here today for recheck of left shoulder. Patient had labral repair on  8/30/2018. Patient states that his shoulder pain is 6/10.  Clinically is much better the last time he was typing of laundry basket and felt a little slippage of his arm which is a bit concerning.  We spent quite a lot of talking about that today.    Allergies   Allergen Reactions   • Augmentin [Amoxicillin-Pot Clavulanate] Other (See Comments)     unknown   • Erythromycin Ethylsuccinate Unknown (See Comments)     Allergy to Pediazole   • Other Other (See Comments)     PEDIAZOLE   • Codeine Rash   • Sulfa Antibiotics Rash         Current Outpatient Prescriptions:   •  HYDROcodone-acetaminophen (NORCO) 7.5-325 MG per tablet, Take 1 tablet by mouth Every 6 (Six) Hours As Needed for Moderate Pain ., Disp: 30 tablet, Rfl: 0    Current Facility-Administered Medications:   •  ondansetron ODT (ZOFRAN-ODT) disintegrating tablet 4 mg, 4 mg, Oral, Q6H PRN, Lawrence Serrano MD    No fevers or chills.  No nausea or vomiting.      PHYSICAL EXAMINATION:       Swapnil Kyle is a 28 y.o. male    Patient is awake and alert, answers questions appropriately and is in no apparent distress.    GAIT:     []  Normal  []  Antalgic    Assistive device: []  None  []  Walker     []  Crutches  []  Cane     []  Wheelchair  []  Stretcher    Ortho Exam  Are good.  He is neurovascularly intact.  He is actually moving his shoulder pretty well.    No results found.        ASSESSMENT:    Diagnoses and all orders for this visit:    Labral tear of shoulder, left,           PLAN he needs to slow down limit his activity somewhat.  I've asked him to give this 3-4 months to settle down.  I'm a bit concerned about his sensation of giving way.  I  certainly don't think the labrum is healed yet.  I'll him up in 3-4 weeks    No Follow-up on file.    Lawrence Serrano MD

## 2018-11-12 ENCOUNTER — OFFICE VISIT (OUTPATIENT)
Dept: ORTHOPEDIC SURGERY | Facility: CLINIC | Age: 28
End: 2018-11-12

## 2018-11-12 VITALS — HEIGHT: 68 IN | WEIGHT: 157 LBS | BODY MASS INDEX: 23.79 KG/M2

## 2018-11-12 DIAGNOSIS — S43.432A LABRAL TEAR OF SHOULDER, LEFT, INITIAL ENCOUNTER: Primary | ICD-10-CM

## 2018-11-12 PROCEDURE — 99024 POSTOP FOLLOW-UP VISIT: CPT | Performed by: ORTHOPAEDIC SURGERY

## 2018-11-12 PROCEDURE — 96372 THER/PROPH/DIAG INJ SC/IM: CPT | Performed by: ORTHOPAEDIC SURGERY

## 2018-11-12 RX ORDER — METHYLPREDNISOLONE ACETATE 40 MG/ML
80 INJECTION, SUSPENSION INTRA-ARTICULAR; INTRALESIONAL; INTRAMUSCULAR; SOFT TISSUE ONCE
Status: COMPLETED | OUTPATIENT
Start: 2018-11-12 | End: 2018-11-12

## 2018-11-12 RX ADMIN — METHYLPREDNISOLONE ACETATE 80 MG: 40 INJECTION, SUSPENSION INTRA-ARTICULAR; INTRALESIONAL; INTRAMUSCULAR; SOFT TISSUE at 15:50

## 2018-11-12 NOTE — PROGRESS NOTES
"Swapnil Kyle is a 28 y.o. male returns for     Chief Complaint   Patient presents with   • Left Shoulder - Follow-up       HISTORY OF PRESENT ILLNESS: Patient is here today for follow up of left shoulder. Patient had labral repair on 8/30/2018. He states that his pain stays a constant 6/10.  He cannot really improved much since we saw him last.  He complains of some feelings of instability in the shoulder and pain with motion.  He is not really improved since we saw him last he is about 2 and half months out.       CONCURRENT MEDICAL HISTORY:    The following portions of the patient's history were reviewed and updated as appropriate: allergies, current medications, past family history, past medical history, past social history, past surgical history and problem list.     ROS  No fevers or chills.  No chest pain or shortness of air.  No GI or  disturbances.    PHYSICAL EXAMINATION:       Ht 172.7 cm (68\")   Wt 71.2 kg (157 lb)   BMI 23.87 kg/m²     Physical Exam    GAIT:     []  Normal  []  Antalgic    Assistive device: []  None  []  Walker     []  Crutches  []  Cane     []  Wheelchair  []  Stretcher    Ortho Exam   Wounds look good.  He is tender anteriorly.  He has pain with abduction and pain with external rotation and forward flexion.  Grossly neurologically intact.    No results found.    To x-ray view as well as a true AP and scapular taken to see any bony pathology or.      ASSESSMENT:    Diagnoses and all orders for this visit:    Labral tear of shoulder, left,   -     Cancel: XR Shoulder 1 View Left; Future        Gone over his arthroscopic pictures with him as well.  He may have a little stiffness here and out of her Depo-Medrol follow-up in a month.  We discussed getting an MRI arthrogram if this does not continue to improve.  We have discussed possibility of failure the labral repair.  PLANNo Follow-up on file.    Lawrence Serrano MD  "

## 2018-11-29 ENCOUNTER — OFFICE VISIT (OUTPATIENT)
Dept: ORTHOPEDIC SURGERY | Facility: CLINIC | Age: 28
End: 2018-11-29

## 2018-11-29 VITALS — HEIGHT: 68 IN | WEIGHT: 167 LBS | BODY MASS INDEX: 25.31 KG/M2

## 2018-11-29 DIAGNOSIS — S43.432A LABRAL TEAR OF SHOULDER, LEFT, INITIAL ENCOUNTER: Primary | ICD-10-CM

## 2018-11-29 PROCEDURE — 99024 POSTOP FOLLOW-UP VISIT: CPT | Performed by: ORTHOPAEDIC SURGERY

## 2018-11-29 NOTE — PROGRESS NOTES
Swapnil Kyle is a 28 y.o. male is s/p  DIAGNOSTIC ARTHROSCOPY LEFT SHOULDER WITH LABRAL REPAIR AS INDICATED     Chief Complaint   Patient presents with   • Left Shoulder - Follow-up       HISTORY OF PRESENT ILLNESS: Patient is here today for recheck of left shoulder. Patient states that his pain is a normal 6/10. His labral repair was done 8/30/2018.  Is now 3 months that pain is a little bit better as time goes on but he still has some symptoms of instability.       Allergies   Allergen Reactions   • Augmentin [Amoxicillin-Pot Clavulanate] Other (See Comments)     unknown   • Erythromycin Ethylsuccinate Unknown (See Comments)     Allergy to Pediazole   • Other Other (See Comments)     PEDIAZOLE   • Codeine Rash   • Sulfa Antibiotics Rash       No current outpatient medications on file.    No fevers or chills.  No nausea or vomiting.      PHYSICAL EXAMINATION:       Swapnil Kyle is a 28 y.o. male    Patient is awake and alert, answers questions appropriately and is in no apparent distress.    GAIT:     [x]  Normal  []  Antalgic    Assistive device: []  None  []  Walker     []  Crutches  []  Cane     []  Wheelchair  []  Stretcher    Ortho Exam  Wounds look good.  Some pain with external rotation pulling down the arm was sitting position also hurts him.  Neurologically intact.    Xr Scapula Left    Result Date: 11/12/2018  Narrative: 2 views left scapula Comparisons none .  The scapula as well as an axillary view show no obvious scapular defects or untoward effects from prior labral repair. Impression: Negative left scapular views          ASSESSMENT:    Diagnoses and all orders for this visit:    Labral tear of shoulder, left,           PLAN certainly of concern is not progressing is been quite a bit of time talking to him today.  I've also discussed his case with Dr. Hutchison.  I'm concerned about getting an MRI scan at 3 months.  Certainly we'll be able to repeat it if we need 13 months after that.   I think after discussing this with the patient has rather than wait a month is not better than would proceed with MRI scan with contrast.  It may be actually at that point but still I think he'll be problems interpreting the data    No Follow-up on file.    Vandana Ortega MA

## 2018-12-20 ENCOUNTER — OFFICE VISIT (OUTPATIENT)
Dept: ORTHOPEDIC SURGERY | Facility: CLINIC | Age: 28
End: 2018-12-20

## 2018-12-20 VITALS — HEIGHT: 68 IN | WEIGHT: 167 LBS | BODY MASS INDEX: 25.31 KG/M2

## 2018-12-20 DIAGNOSIS — S43.432A LABRAL TEAR OF SHOULDER, LEFT, INITIAL ENCOUNTER: Primary | ICD-10-CM

## 2018-12-20 PROCEDURE — 99213 OFFICE O/P EST LOW 20 MIN: CPT | Performed by: ORTHOPAEDIC SURGERY

## 2018-12-20 RX ORDER — CITALOPRAM 40 MG/1
40 TABLET ORAL
COMMUNITY
Start: 2014-12-19 | End: 2019-01-24

## 2018-12-20 RX ORDER — ALBUTEROL SULFATE 90 UG/1
AEROSOL, METERED RESPIRATORY (INHALATION)
COMMUNITY
End: 2019-01-24

## 2018-12-20 RX ORDER — ARIPIPRAZOLE 2 MG/1
TABLET ORAL
COMMUNITY
Start: 2015-01-27 | End: 2019-01-24

## 2018-12-20 RX ORDER — DIAZEPAM 10 MG/1
TABLET ORAL
COMMUNITY
End: 2019-01-24

## 2018-12-20 RX ORDER — BUDESONIDE 0.5 MG/2ML
INHALANT ORAL
COMMUNITY
Start: 2014-09-23 | End: 2019-01-24

## 2018-12-20 NOTE — PROGRESS NOTES
"Swapnil Kyle is a 28 y.o. male returns for     Chief Complaint   Patient presents with   • Left Shoulder - Follow-up       HISTORY OF PRESENT ILLNESS: Patient is here today for follow up of left shoulder. Patient states that his pain is a dull ache 6/10.  Still having the same issues some sensations of instability.  He still describes his pain is 6-10 which is where he was last time       CONCURRENT MEDICAL HISTORY:    The following portions of the patient's history were reviewed and updated as appropriate: allergies, current medications, past family history, past medical history, past social history, past surgical history and problem list.     ROS  No fevers or chills.  No chest pain or shortness of air.  No GI or  disturbances.    PHYSICAL EXAMINATION:       Ht 172.7 cm (68\")   Wt 75.8 kg (167 lb)   BMI 25.39 kg/m²     Physical Exam   Constitutional: He is oriented to person, place, and time. He appears well-developed.   HENT:   Head: Normocephalic and atraumatic.   Eyes: EOM are normal. Pupils are equal, round, and reactive to light.   Neck: Neck supple. No tracheal deviation present.   Pulmonary/Chest: Effort normal.   Musculoskeletal: He exhibits tenderness. He exhibits no edema or deformity.   Neurological: He is alert and oriented to person, place, and time.   Skin: Skin is warm and dry. No erythema.   Psychiatric: He has a normal mood and affect.       GAIT:     []  Normal  []  Antalgic    Assistive device: []  None  []  Walker     []  Crutches  []  Cane     []  Wheelchair  []  Stretcher    Ortho Exam  Evidence of hyperlaxity in his upper extremities hyperextension of his elbow and his wrist with a positive sulcus sign on the right shoulder.  Still some pain with anterior stressing and apprehension sign of the right side.    No results found.          ASSESSMENT:    Diagnoses and all orders for this visit:    Labral tear of shoulder, left,   -     MRI Shoulder Left Arthrogram; Future    Other " orders  -     albuterol sulfate HFA (PROVENTIL HFA) 108 (90 Base) MCG/ACT inhaler; Proventil HFA 90 mcg/actuation aerosol inhaler  -     ARIPiprazole (ABILIFY) 2 MG tablet; Abilify 2 mg tablet  -     budesonide (PULMICORT) 0.5 MG/2ML nebulizer solution; Take 2 mLs by nebulization 2 times daily.  -     citalopram (CeleXA) 40 MG tablet; Take 40 mg by mouth.  -     diazePAM (VALIUM) 10 MG tablet; diazepam 10 mg tablet          PLAN as we discussed previously and we'll send him for MRI arthrogram to evaluate the labral repair.  I think to be some issue I will see if that at 4 months out.  We will delineate the problem.  I do think there is an issue with his hypermobility but clearly before I think he had anterior instability.  It was from a traumatic origin.  We'll see him back after his scan    No Follow-up on file.    Lawrence Serrano MD

## 2019-01-03 ENCOUNTER — HOSPITAL ENCOUNTER (OUTPATIENT)
Dept: CT IMAGING | Facility: HOSPITAL | Age: 29
Discharge: HOME | End: 2019-01-03

## 2019-01-03 ENCOUNTER — HOSPITAL ENCOUNTER (OUTPATIENT)
Dept: MRI IMAGING | Facility: HOSPITAL | Age: 29
Discharge: HOME | End: 2019-01-03
Admitting: RADIOLOGY

## 2019-01-03 DIAGNOSIS — S43.432A LABRAL TEAR OF SHOULDER, LEFT, INITIAL ENCOUNTER: ICD-10-CM

## 2019-01-03 DIAGNOSIS — M25.512 LEFT SHOULDER PAIN, UNSPECIFIED CHRONICITY: ICD-10-CM

## 2019-01-03 DIAGNOSIS — S43.432D LABRAL TEAR OF SHOULDER, LEFT, SUBSEQUENT ENCOUNTER: ICD-10-CM

## 2019-01-03 PROCEDURE — 77012 CT SCAN FOR NEEDLE BIOPSY: CPT

## 2019-01-03 PROCEDURE — A9576 INJ PROHANCE MULTIPACK: HCPCS | Performed by: RADIOLOGY

## 2019-01-03 PROCEDURE — 73222 MRI JOINT UPR EXTREM W/DYE: CPT

## 2019-01-03 PROCEDURE — 25010000002 GADOTERIDOL PER 1 ML: Performed by: RADIOLOGY

## 2019-01-03 RX ADMIN — GADOTERIDOL 1 ML: 279.3 INJECTION, SOLUTION INTRAVENOUS at 15:22

## 2019-01-03 NOTE — PRE-PROCEDURE NOTE
28 year old male, referred for contrast injection for mri arthrogram. Procedure explained. Consent obtained.Risks and benefits advised.

## 2019-01-09 ENCOUNTER — OFFICE VISIT (OUTPATIENT)
Dept: ORTHOPEDIC SURGERY | Facility: CLINIC | Age: 29
End: 2019-01-09

## 2019-01-09 VITALS — HEIGHT: 68 IN | WEIGHT: 167 LBS | BODY MASS INDEX: 25.31 KG/M2

## 2019-01-09 DIAGNOSIS — M25.512 ACUTE PAIN OF LEFT SHOULDER: ICD-10-CM

## 2019-01-09 DIAGNOSIS — S43.432A LABRAL TEAR OF SHOULDER, LEFT, INITIAL ENCOUNTER: Primary | ICD-10-CM

## 2019-01-09 DIAGNOSIS — M25.512 LEFT SHOULDER PAIN, UNSPECIFIED CHRONICITY: ICD-10-CM

## 2019-01-09 PROCEDURE — 99213 OFFICE O/P EST LOW 20 MIN: CPT | Performed by: ORTHOPAEDIC SURGERY

## 2019-01-09 NOTE — PROGRESS NOTES
"Swapnil Kyle is a 28 y.o. male returns for     Chief Complaint   Patient presents with   • Left Shoulder - Follow-up   • Results     mri and ct        HISTORY OF PRESENT ILLNESS: f/u on left shoulder and needs ct and mri  results today, still feeling about the same really no changes.       CONCURRENT MEDICAL HISTORY:    The following portions of the patient's history were reviewed and updated as appropriate: allergies, current medications, past family history, past medical history, past social history, past surgical history and problem list.     ROS  No fevers or chills.  No chest pain or shortness of air.  No GI or  disturbances.    PHYSICAL EXAMINATION:       Ht 172.7 cm (68\")   Wt 75.8 kg (167 lb)   BMI 25.39 kg/m²     Physical Exam   Constitutional: He is oriented to person, place, and time. He appears well-developed and well-nourished.   HENT:   Head: Normocephalic and atraumatic.   Eyes: EOM are normal. Pupils are equal, round, and reactive to light.   Neck: Neck supple. No tracheal deviation present.   Pulmonary/Chest: Effort normal.   Musculoskeletal: He exhibits tenderness. He exhibits no edema or deformity.   Neurological: He is alert and oriented to person, place, and time.   Skin: Skin is warm and dry. No erythema.   Psychiatric: He has a normal mood and affect.   Vitals reviewed.      GAIT:     []  Normal  []  Antalgic    Assistive device: []  None  []  Walker     []  Crutches  []  Cane     []  Wheelchair  []  Stretcher    Ortho Exam   Is impingement positive apprehension sign.  Neurovascular intact.  Wounds are well-healed slight limitation in abduction and forward flexion.    Ct Guided Contrast Injection Joint    Result Date: 1/3/2019  Narrative: CT-guided contrast injection glenohumeral joint (prior to MR arthrography) MR arthrogram left shoulder. HISTORY: Persistent shoulder pain. Weight lifting injury. Patient states prior history of shoulder surgery CT injection: TECHNIQUE: 8 mL of a " dilute mixture of normal saline, Isovue-300, gadolinium, injected into the left glenohumeral joint under CT guidance. Helical scanning with axial and coronal reformations. Soft tissue, lung, and bone windows reviewed. This exam was performed according to our departmental dose-optimization program, which includes automated exposure control, adjustment of the mA and/or kV according to patient size and/or use of iterative reconstruction technique. Technically successful and uneventful CT-guided injection using a 20-gauge needle. CT images demonstrate a satisfactory intra-articular injection. MR ARTHROGRAM: MRI left shoulder obtained both prior to and following the intra-articular injection of dilute mixture of saline, and contrast and gadolinium into the glenohumeral joint. Susceptibility artifact adjacent to the anterior portion of the glenoid scapula, sequela of prior surgery. The anterior labrum has a somewhat degenerated appearance especially the more inferior aspect. A large portion of the superior labrum is absent i.e. avulsion type tear versus surgical resection. (Series 3 images 10-12) the more anterior aspect of the superior labrum has a more normal appearance. Subtle linear tear posterior labrum (series 2 image nine) . Normal-appearing supraspinatous and infraspinatus tendons. No evidence of any tear. There is bone edema in the spur lateral aspect of the proximal humerus. This may represent contusion or traction, Tug- type injury. There is a defect in the anterior capsule, middle glenohumeral ligament indicative of middle glenohumeral ligament injury. Subscapularis tendon is normal. There is no appreciable a comminuted fibular joint arthrosis or impingement.     Impression: CONCLUSION: Bone edema superolateral aspect of the proximal humerus either contusion or traction type injury. CONCLUSION: Irregularity, degenerative type tears anterior labrum especially the more inferior aspect. Absence of a large  portions of the posterior aspect of the superior labrum. This may represent avulsion versus surgical resection. More subtle linear tear posterior labrum. Abnormality of the anterior capsule, disruption of the middle glenohumeral ligament. MRI of the left shoulder, MR arthrography is otherwise unremarkable. Electronically signed by:  Roly Corrales MD  1/3/2019 4:11 PM [facility] Workstation: [id number]    Mri Shoulder Left Arthrogram    Result Date: 1/3/2019  Narrative: CT-guided contrast injection glenohumeral joint (prior to MR arthrography) MR arthrogram left shoulder. HISTORY: Persistent shoulder pain. Weight lifting injury. Patient states prior history of shoulder surgery CT injection: TECHNIQUE: 8 mL of a dilute mixture of normal saline, Isovue-300, gadolinium, injected into the left glenohumeral joint under CT guidance. Helical scanning with axial and coronal reformations. Soft tissue, lung, and bone windows reviewed. This exam was performed according to our departmental dose-optimization program, which includes automated exposure control, adjustment of the mA and/or kV according to patient size and/or use of iterative reconstruction technique. Technically successful and uneventful CT-guided injection using a 20-gauge needle. CT images demonstrate a satisfactory intra-articular injection. MR ARTHROGRAM: MRI left shoulder obtained both prior to and following the intra-articular injection of dilute mixture of saline, and contrast and gadolinium into the glenohumeral joint. Susceptibility artifact adjacent to the anterior portion of the glenoid scapula, sequela of prior surgery. The anterior labrum has a somewhat degenerated appearance especially the more inferior aspect. A large portion of the superior labrum is absent i.e. avulsion type tear versus surgical resection. (Series 3 images 10-12) the more anterior aspect of the superior labrum has a more normal appearance. Subtle linear tear posterior labrum (series 2 image  nine) . Normal-appearing supraspinatous and infraspinatus tendons. No evidence of any tear. There is bone edema in the spur lateral aspect of the proximal humerus. This may represent contusion or traction, Tug- type injury. There is a defect in the anterior capsule, middle glenohumeral ligament indicative of middle glenohumeral ligament injury. Subscapularis tendon is normal. There is no appreciable a comminuted fibular joint arthrosis or impingement.     Impression: CONCLUSION: Bone edema superolateral aspect of the proximal humerus either contusion or traction type injury. CONCLUSION: Irregularity, degenerative type tears anterior labrum especially the more inferior aspect. Absence of a large portions of the posterior aspect of the superior labrum. This may represent avulsion versus surgical resection. More subtle linear tear posterior labrum. Abnormality of the anterior capsule, disruption of the middle glenohumeral ligament. MRI of the left shoulder, MR arthrography is otherwise unremarkable. Electronically signed by:  Roly Corrales MD  1/3/2019 4:11 PM CST Workstation: [id number]    I've gone over the scan there appears to be a defect the anterior capsule.  The labrum anteriorly appears intact.  There is bony edema upon the greater tuberosity and V impinges here this may be secondary impingement.        ASSESSMENT:    Diagnoses and all orders for this visit:    Labral tear of shoulder, left,     Left shoulder pain, unspecified chronicity    Acute pain of left shoulder          PLAN this is somewhat of dislocation of the patient.  He is not getting better I have discussed this case previously with Dr. Hutchison will discuss this I think the patient probably needs a repeat arthroscopy and possibly a stabilization procedure.  I will discuss this further with Dr. Hutchison and I will call the patient by the first that we can come up with the plan.  I spent about 30 minutes discussing this with him and again today going over not  only the MRI scan but the previous pictures as having the past.    No Follow-up on file.    Lawrence Serrano MD

## 2019-01-24 ENCOUNTER — OFFICE VISIT (OUTPATIENT)
Dept: ORTHOPEDIC SURGERY | Facility: CLINIC | Age: 29
End: 2019-01-24

## 2019-01-24 VITALS — WEIGHT: 164 LBS | HEIGHT: 68 IN | BODY MASS INDEX: 24.86 KG/M2

## 2019-01-24 DIAGNOSIS — S43.432D TEAR OF LEFT GLENOID LABRUM, SUBSEQUENT ENCOUNTER: ICD-10-CM

## 2019-01-24 DIAGNOSIS — S43.432A LABRAL TEAR OF SHOULDER, LEFT, INITIAL ENCOUNTER: Primary | ICD-10-CM

## 2019-01-24 DIAGNOSIS — M25.512 LEFT SHOULDER PAIN, UNSPECIFIED CHRONICITY: ICD-10-CM

## 2019-01-24 PROCEDURE — 99214 OFFICE O/P EST MOD 30 MIN: CPT | Performed by: ORTHOPAEDIC SURGERY

## 2019-01-24 PROCEDURE — 20610 DRAIN/INJ JOINT/BURSA W/O US: CPT | Performed by: ORTHOPAEDIC SURGERY

## 2019-01-24 RX ORDER — CLINDAMYCIN PHOSPHATE 900 MG/50ML
900 INJECTION INTRAVENOUS ONCE
Status: CANCELLED | OUTPATIENT
Start: 2019-02-20 | End: 2019-01-24

## 2019-01-24 RX ORDER — LIDOCAINE HYDROCHLORIDE 10 MG/ML
8 INJECTION, SOLUTION INFILTRATION; PERINEURAL
Status: COMPLETED | OUTPATIENT
Start: 2019-01-24 | End: 2019-01-24

## 2019-01-24 RX ADMIN — LIDOCAINE HYDROCHLORIDE 8 ML: 10 INJECTION, SOLUTION INFILTRATION; PERINEURAL at 09:32

## 2019-01-24 NOTE — PROGRESS NOTES
Swapnil Kyle is a 28 y.o. male returns for     Chief Complaint   Patient presents with   • Left Shoulder - Follow-up, Pain       HISTORY OF PRESENT ILLNESS: f/u left shoulder pain. Patient states shoulder is same since last visit, no new complaints.  He is seen today in conjunction with Dr. Hutchison we have discussed his case on several occasions and I have notified the patient by telephone last week and brought him in today for a joint consultation.  He has persistent shoulder pain we have both gone over the MRI scan together discuss further treatment option for this patient.  He is here today for this consultation and discussion.       CONCURRENT MEDICAL HISTORY:    Past Medical History:   Diagnosis Date   • Acute pharyngitis    • Allergic rhinitis    • Allergic rhinitis due to pollen    • Anemia    • Aphthous ulcer of mouth    • Asthma     as a child   • Backache    • Common cold    • Cough    • Disorder of skin    • Dysuria    • Exanthematous disorder     numular eczema    • History of ITP    • History of regular medication use     long term    • History of respiratory therapy 03/07/2011    Nebulizer Treatment 68899 (1) - REJI Arredondo   • Hyperglycemia    • Hyperuricemia    • Increased frequency of urination    • Malaise and fatigue    • Short stature disorder    • Thrombocytopenic purpura (CMS/HCC)    • Type II diabetes mellitus, uncontrolled (CMS/HCC)     diet controlled       Allergies   Allergen Reactions   • Augmentin [Amoxicillin-Pot Clavulanate] Other (See Comments)     unknown   • Erythromycin Ethylsuccinate Unknown (See Comments)     Allergy to Pediazole   • Other Other (See Comments)     PEDIAZOLE   • Codeine Rash   • Sulfa Antibiotics Rash       No current outpatient medications on file.    Past Surgical History:   Procedure Laterality Date   • MYRINGOTOMY Bilateral 06/23/1992    TUBE IMPLANT GENERAL ANESTHETIC 30227 (1) - Bilateral myringotomies w/ Fernández ventilating tube implants. examination  "of adenoids   • NOSE SURGERY  02/22/2002    Removal of the nasal packing under general anesthetic & inspection of nasal cavity. Patient has nasal packing that needs to be removed   • OTHER SURGICAL HISTORY  03/07/2011    Biopsy, Each Additional Lesion 72093 (1) - REJI Arredondo   • SHOULDER ARTHROSCOPY W/ SUPERIOR LABRAL ANTERIOR POSTERIOR REPAIR Left 8/30/2018    Procedure: DIAGNOSTIC ARTHROSCOPY LEFT SHOULDER WITH LABRAL REPAIR AS INDICATED;  Surgeon: Lawrence Serrano MD;  Location: [facility];  Service: Orthopedics   • SPLENECTOMY     • TONSILLECTOMY AND ADENOIDECTOMY Bilateral 10/19/1993    Bilateral myringotomies with Durivent tube implants. T&A. Chronic recurrent bilateral secretory otitis media. Marked enlargement of the tonsils & adenoids causing infection           ROS: Review of systems has been updated as of today's date.  All other systems are negative except as noted previously.    PHYSICAL EXAMINATION:       Ht 172.7 cm (68\")   Wt 74.4 kg (164 lb)   BMI 24.94 kg/m²     Physical Exam   Constitutional: He is oriented to person, place, and time. He appears well-developed and well-nourished.   HENT:   Head: Normocephalic and atraumatic.   Eyes: EOM are normal. Pupils are equal, round, and reactive to light.   Neck: Neck supple. No tracheal deviation present.   Pulmonary/Chest: Effort normal.   Musculoskeletal: Normal range of motion. He exhibits tenderness. He exhibits no edema or deformity.   Neurological: He is alert and oriented to person, place, and time. No sensory deficit.   Skin: Skin is warm and dry. No erythema.   Psychiatric: He has a normal mood and affect.       GAIT:     [x]  Normal  []  Antalgic    Assistive device: [x]  None  []  Walker     []  Crutches  []  Cane     []  Wheelchair  []  Stretcher    Ortho Exam  Certainly has evidence of hyperlaxity in the upper extremities.  He has a mildly positive sulcus test.  Mildly positive apprehension test.  He does impinge ×2.  Mild pain with " resistance of his cuff.  He remains neurovascularly intact.            Large Joint Arthrocentesis  Date/Time: 1/24/2019 9:32 AM  Consent given by: patient  Timeout: Immediately prior to procedure a time out was called to verify the correct patient, procedure, equipment, support staff and site/side marked as required   Supporting Documentation  Indications: pain   Procedure Details  Location: shoulder (left) -   Needle size: 22 G  Medications administered: 8 mL lidocaine 1 %            ASSESSMENT:    Diagnoses and all orders for this visit:    Labral tear of shoulder, left,   -     Large Joint Arthrocentesis    Left shoulder pain, unspecified chronicity  -     Large Joint Arthrocentesis    Tear of left glenoid labrum, subsequent encounter  -     Large Joint Arthrocentesis          PLAN we have spent a combined 30 minutes with the patient and I have injected his subacromial space with 8 cc of Xylocaine.  His impingement sign is really diminished with this I think I would recommend concomitant subacromial decompression.  We have discussed the case with the patient and Dr. Hutchison is discussed as well as the risk and benefits as I have also reiterated the patient and our plan is to do the patient together we'll place him in a lateral position for arthroscopic evaluation.  We will dress the labrum and consider a capsular plication.  We will also consider subacromial decompression.  The patient is not particularly tender over the AC joint.  We will look in the joint and address the pathology that we can.  Certainly the risk and benefits have been explained thoroughly by me but also by Dr. Hutchison including the failure to resolve his pain prolonged persistent pain and need for further surgery down the road, bleeding, blood clot, neurovascular injury, infection, prolonged recovery, medical anesthetic complications, etc. patient understands detailed informed consent is given we will go ahead and proceed as planned, we will plan  a date on a Wednesday and we are co-surgeon's.    No Follow-up on file.    Lawrence Serrano MD

## 2019-02-20 ENCOUNTER — ANESTHESIA EVENT (OUTPATIENT)
Dept: PERIOP | Facility: HOSPITAL | Age: 29
End: 2019-02-20

## 2019-02-20 ENCOUNTER — HOSPITAL ENCOUNTER (OUTPATIENT)
Facility: HOSPITAL | Age: 29
Setting detail: HOSPITAL OUTPATIENT SURGERY
Discharge: HOME | End: 2019-02-20
Attending: ORTHOPAEDIC SURGERY | Admitting: ORTHOPAEDIC SURGERY

## 2019-02-20 ENCOUNTER — ANESTHESIA (OUTPATIENT)
Dept: PERIOP | Facility: HOSPITAL | Age: 29
End: 2019-02-20

## 2019-02-20 VITALS
OXYGEN SATURATION: 100 % | DIASTOLIC BLOOD PRESSURE: 74 MMHG | TEMPERATURE: 97 F | HEART RATE: 86 BPM | BODY MASS INDEX: 23.18 KG/M2 | SYSTOLIC BLOOD PRESSURE: 143 MMHG | HEIGHT: 69 IN | WEIGHT: 156.53 LBS | RESPIRATION RATE: 18 BRPM

## 2019-02-20 DIAGNOSIS — S43.432D TEAR OF LEFT GLENOID LABRUM, SUBSEQUENT ENCOUNTER: ICD-10-CM

## 2019-02-20 DIAGNOSIS — M25.512 LEFT SHOULDER PAIN, UNSPECIFIED CHRONICITY: Primary | ICD-10-CM

## 2019-02-20 LAB
GLUCOSE BLDC GLUCOMTR-MCNC: 74 MG/DL (ref 70–130)
GLUCOSE BLDC GLUCOMTR-MCNC: 88 MG/DL (ref 70–130)

## 2019-02-20 PROCEDURE — 25010000002 PHENYLEPHRINE PER 1 ML: Performed by: NURSE ANESTHETIST, CERTIFIED REGISTERED

## 2019-02-20 PROCEDURE — 25010000002 MIDAZOLAM PER 1 MG: Performed by: NURSE ANESTHETIST, CERTIFIED REGISTERED

## 2019-02-20 PROCEDURE — 25010000002 FENTANYL CITRATE (PF) 100 MCG/2ML SOLUTION: Performed by: NURSE ANESTHETIST, CERTIFIED REGISTERED

## 2019-02-20 PROCEDURE — C1713 ANCHOR/SCREW BN/BN,TIS/BN: HCPCS | Performed by: ORTHOPAEDIC SURGERY

## 2019-02-20 PROCEDURE — 25010000002 ROPIVACAINE PER 1 MG: Performed by: NURSE ANESTHETIST, CERTIFIED REGISTERED

## 2019-02-20 PROCEDURE — 29826 SHO ARTHRS SRG DECOMPRESSION: CPT | Performed by: ORTHOPAEDIC SURGERY

## 2019-02-20 PROCEDURE — 25010000002 EPINEPHRINE PER 0.1 MG: Performed by: ORTHOPAEDIC SURGERY

## 2019-02-20 PROCEDURE — 25010000002 ONDANSETRON PER 1 MG: Performed by: NURSE ANESTHETIST, CERTIFIED REGISTERED

## 2019-02-20 PROCEDURE — 29806 SHO ARTHRS SRG CAPSULORRAPHY: CPT | Performed by: ORTHOPAEDIC SURGERY

## 2019-02-20 PROCEDURE — 25010000002 PROPOFOL 10 MG/ML EMULSION: Performed by: NURSE ANESTHETIST, CERTIFIED REGISTERED

## 2019-02-20 PROCEDURE — 82962 GLUCOSE BLOOD TEST: CPT

## 2019-02-20 DEVICE — SUT/ANCH BIOCOMP PUSH/LK 2.9X12.5MM: Type: IMPLANTABLE DEVICE | Site: SHOULDER | Status: FUNCTIONAL

## 2019-02-20 DEVICE — SUT LABRALTAPE PE KNOTLSS 1.5MM 36IN WHT: Type: IMPLANTABLE DEVICE | Site: SHOULDER | Status: FUNCTIONAL

## 2019-02-20 RX ORDER — ONDANSETRON 2 MG/ML
4 INJECTION INTRAMUSCULAR; INTRAVENOUS ONCE AS NEEDED
Status: DISCONTINUED | OUTPATIENT
Start: 2019-02-20 | End: 2019-02-20 | Stop reason: HOSPADM

## 2019-02-20 RX ORDER — PROPOFOL 10 MG/ML
VIAL (ML) INTRAVENOUS AS NEEDED
Status: DISCONTINUED | OUTPATIENT
Start: 2019-02-20 | End: 2019-02-20 | Stop reason: SURG

## 2019-02-20 RX ORDER — HYDROCODONE BITARTRATE AND ACETAMINOPHEN 7.5; 325 MG/1; MG/1
1 TABLET ORAL EVERY 4 HOURS PRN
Qty: 40 TABLET | Refills: 0 | Status: SHIPPED | OUTPATIENT
Start: 2019-02-20 | End: 2019-02-28

## 2019-02-20 RX ORDER — CLINDAMYCIN PHOSPHATE 900 MG/50ML
900 INJECTION INTRAVENOUS ONCE
Status: COMPLETED | OUTPATIENT
Start: 2019-02-20 | End: 2019-02-20

## 2019-02-20 RX ORDER — FENTANYL CITRATE 50 UG/ML
INJECTION, SOLUTION INTRAMUSCULAR; INTRAVENOUS AS NEEDED
Status: DISCONTINUED | OUTPATIENT
Start: 2019-02-20 | End: 2019-02-20 | Stop reason: SURG

## 2019-02-20 RX ORDER — SODIUM CHLORIDE 9 MG/ML
1000 INJECTION, SOLUTION INTRAVENOUS CONTINUOUS
Status: DISCONTINUED | OUTPATIENT
Start: 2019-02-20 | End: 2019-02-20 | Stop reason: HOSPADM

## 2019-02-20 RX ORDER — ROPIVACAINE HYDROCHLORIDE 5 MG/ML
INJECTION, SOLUTION EPIDURAL; INFILTRATION; PERINEURAL
Status: COMPLETED | OUTPATIENT
Start: 2019-02-20 | End: 2019-02-20

## 2019-02-20 RX ORDER — EPHEDRINE SULFATE 50 MG/ML
INJECTION, SOLUTION INTRAVENOUS AS NEEDED
Status: DISCONTINUED | OUTPATIENT
Start: 2019-02-20 | End: 2019-02-20 | Stop reason: SURG

## 2019-02-20 RX ORDER — DEXAMETHASONE SODIUM PHOSPHATE 4 MG/ML
8 INJECTION, SOLUTION INTRA-ARTICULAR; INTRALESIONAL; INTRAMUSCULAR; INTRAVENOUS; SOFT TISSUE ONCE AS NEEDED
Status: DISCONTINUED | OUTPATIENT
Start: 2019-02-20 | End: 2019-02-20 | Stop reason: HOSPADM

## 2019-02-20 RX ORDER — EPINEPHRINE 1 MG/ML
INJECTION, SOLUTION, CONCENTRATE INTRAVENOUS AS NEEDED
Status: DISCONTINUED | OUTPATIENT
Start: 2019-02-20 | End: 2019-02-20 | Stop reason: HOSPADM

## 2019-02-20 RX ORDER — ONDANSETRON 2 MG/ML
INJECTION INTRAMUSCULAR; INTRAVENOUS AS NEEDED
Status: DISCONTINUED | OUTPATIENT
Start: 2019-02-20 | End: 2019-02-20 | Stop reason: SURG

## 2019-02-20 RX ORDER — PROMETHAZINE HYDROCHLORIDE 25 MG/ML
12.5 INJECTION, SOLUTION INTRAMUSCULAR; INTRAVENOUS ONCE AS NEEDED
Status: DISCONTINUED | OUTPATIENT
Start: 2019-02-20 | End: 2019-02-20 | Stop reason: HOSPADM

## 2019-02-20 RX ORDER — PROMETHAZINE HYDROCHLORIDE 25 MG/1
25 SUPPOSITORY RECTAL ONCE AS NEEDED
Status: DISCONTINUED | OUTPATIENT
Start: 2019-02-20 | End: 2019-02-20 | Stop reason: HOSPADM

## 2019-02-20 RX ORDER — MIDAZOLAM HYDROCHLORIDE 1 MG/ML
INJECTION INTRAMUSCULAR; INTRAVENOUS AS NEEDED
Status: DISCONTINUED | OUTPATIENT
Start: 2019-02-20 | End: 2019-02-20 | Stop reason: SURG

## 2019-02-20 RX ORDER — PROMETHAZINE HYDROCHLORIDE 25 MG/1
25 TABLET ORAL ONCE AS NEEDED
Status: DISCONTINUED | OUTPATIENT
Start: 2019-02-20 | End: 2019-02-20 | Stop reason: HOSPADM

## 2019-02-20 RX ORDER — HYDROCODONE BITARTRATE AND ACETAMINOPHEN 7.5; 325 MG/1; MG/1
1 TABLET ORAL ONCE AS NEEDED
Status: DISCONTINUED | OUTPATIENT
Start: 2019-02-20 | End: 2019-02-20 | Stop reason: HOSPADM

## 2019-02-20 RX ORDER — SCOLOPAMINE TRANSDERMAL SYSTEM 1 MG/1
1 PATCH, EXTENDED RELEASE TRANSDERMAL CONTINUOUS
Status: DISCONTINUED | OUTPATIENT
Start: 2019-02-20 | End: 2019-02-20 | Stop reason: HOSPADM

## 2019-02-20 RX ADMIN — MEPERIDINE HYDROCHLORIDE 12.5 MG: 25 INJECTION, SOLUTION INTRAMUSCULAR; INTRAVENOUS; SUBCUTANEOUS at 09:50

## 2019-02-20 RX ADMIN — EPHEDRINE SULFATE 5 MG: 50 INJECTION INTRAVENOUS at 09:14

## 2019-02-20 RX ADMIN — MEPERIDINE HYDROCHLORIDE 12.5 MG: 25 INJECTION, SOLUTION INTRAMUSCULAR; INTRAVENOUS; SUBCUTANEOUS at 09:44

## 2019-02-20 RX ADMIN — ROPIVACAINE HYDROCHLORIDE 20 ML: 5 INJECTION, SOLUTION EPIDURAL; INFILTRATION; PERINEURAL at 07:20

## 2019-02-20 RX ADMIN — FENTANYL CITRATE 25 MCG: 50 INJECTION, SOLUTION INTRAMUSCULAR; INTRAVENOUS at 07:20

## 2019-02-20 RX ADMIN — PHENYLEPHRINE HYDROCHLORIDE 100 MCG: 10 INJECTION INTRAVENOUS at 09:21

## 2019-02-20 RX ADMIN — ONDANSETRON 4 MG: 2 INJECTION INTRAMUSCULAR; INTRAVENOUS at 09:20

## 2019-02-20 RX ADMIN — MIDAZOLAM HYDROCHLORIDE 2 MG: 2 INJECTION, SOLUTION INTRAMUSCULAR; INTRAVENOUS at 07:06

## 2019-02-20 RX ADMIN — SODIUM CHLORIDE 1000 ML: 900 INJECTION, SOLUTION INTRAVENOUS at 06:06

## 2019-02-20 RX ADMIN — SODIUM CHLORIDE: 900 INJECTION, SOLUTION INTRAVENOUS at 07:06

## 2019-02-20 RX ADMIN — EPHEDRINE SULFATE 5 MG: 50 INJECTION INTRAVENOUS at 09:00

## 2019-02-20 RX ADMIN — SCOPALAMINE 1 PATCH: 1 PATCH, EXTENDED RELEASE TRANSDERMAL at 06:37

## 2019-02-20 RX ADMIN — PROPOFOL 200 MG: 10 INJECTION, EMULSION INTRAVENOUS at 07:20

## 2019-02-20 RX ADMIN — SODIUM CHLORIDE: 900 INJECTION, SOLUTION INTRAVENOUS at 08:37

## 2019-02-20 RX ADMIN — CLINDAMYCIN IN 5 PERCENT DEXTROSE 900 MG: 18 INJECTION, SOLUTION INTRAVENOUS at 07:06

## 2019-02-20 RX ADMIN — EPHEDRINE SULFATE 5 MG: 50 INJECTION INTRAVENOUS at 08:20

## 2019-02-20 RX ADMIN — FENTANYL CITRATE 25 MCG: 50 INJECTION, SOLUTION INTRAMUSCULAR; INTRAVENOUS at 08:40

## 2019-02-20 NOTE — ANESTHESIA PREPROCEDURE EVALUATION
Anesthesia Evaluation     history of anesthetic complications: PONV  NPO Solid Status: > 8 hours  NPO Liquid Status: > 8 hours           Airway   Mallampati: II  TM distance: >3 FB  Neck ROM: full  Possible difficult intubation  Dental - normal exam     Pulmonary - negative pulmonary ROS and normal exam    breath sounds clear to auscultation  (-) COPD, asthma, sleep apnea, not a smoker  Cardiovascular - normal exam  Exercise tolerance: excellent (>7 METS)    ECG reviewed  Rhythm: regular  Rate: normal    (-) hypertension, valvular problems/murmurs, dysrhythmias, angina, cardiac stents, PVD, DVT, hyperlipidemia    ROS comment: Sinus bradycardia with marked sinus arrhythmia  Otherwise normal ECG Early repolarization  No previous ECGs available  Confirmed by SUNNI    Neuro/Psych- negative ROS  (-) seizures, TIA, CVA, headaches, psychiatric history  GI/Hepatic/Renal/Endo    (-) GERD, liver disease, no renal disease, diabetes, hypothyroidism    Musculoskeletal         ROS comment: Left shoulder  Abdominal    Substance History   (+) alcohol use (rarely),   (-) drug use     OB/GYN          Other        (-) history of cancer                  Anesthesia Plan    ASA 2     general with block   (Interscalene block discussed, patient states did not work last time but willing to try it again.)  intravenous induction   Anesthetic plan, all risks, benefits, and alternatives have been provided, discussed and informed consent has been obtained with: patient.

## 2019-02-20 NOTE — ANESTHESIA POSTPROCEDURE EVALUATION
Patient: Swapnil Kyle    Procedure Summary     Date:  02/20/19 Room / Location:  [facility] OR [address] [place] [address] [facility] OR    Anesthesia Start:  0708 Anesthesia Stop:  0942    Procedures:       SHOULDER ARTHROSCOPY with revision anterior labral repair, SLAP repair, and subacromial decompression (Left Shoulder)      POSSIBLE CAPSULAR PLICATION  ANTERIORLY, POSTERIOR  PROCEDURE AS INDICATED           (PAC BUS) (Left Shoulder) Diagnosis:       Tear of left glenoid labrum, subsequent encounter      (Tear of left glenoid labrum, subsequent encounter [S44.096D])    Surgeon:  Salvador Hutchison MD Provider:  Neeru Hutchins CRNA    Anesthesia Type:  general with block ASA Status:  2          Anesthesia Type: general with block  Last vitals  BP   138/69 (02/20/19 0558)   Temp   98.3 °F (36.8 °C) (02/20/19 0558)   Pulse   78 (02/20/19 0558)   Resp   18 (02/20/19 0558)     SpO2   99 % (02/20/19 0558)     Post Anesthesia Care and Evaluation    Patient location during evaluation: PACU  Patient participation: complete - patient participated  Level of consciousness: awake and alert  Pain management: adequate  Airway patency: patent  Anesthetic complications: No anesthetic complications    Cardiovascular status: acceptable  Respiratory status: acceptable  Hydration status: acceptable

## 2019-02-22 DIAGNOSIS — S43.432A TEAR OF LEFT GLENOID LABRUM, INITIAL ENCOUNTER: Primary | ICD-10-CM

## 2019-02-22 RX ORDER — OXYCODONE AND ACETAMINOPHEN 7.5; 325 MG/1; MG/1
1 TABLET ORAL EVERY 4 HOURS PRN
Qty: 30 TABLET | Refills: 0 | Status: SHIPPED | OUTPATIENT
Start: 2019-02-22 | End: 2019-03-14

## 2019-02-28 ENCOUNTER — OFFICE VISIT (OUTPATIENT)
Dept: ORTHOPEDIC SURGERY | Facility: CLINIC | Age: 29
End: 2019-02-28

## 2019-02-28 VITALS — BODY MASS INDEX: 24.88 KG/M2 | WEIGHT: 168 LBS | HEIGHT: 69 IN

## 2019-02-28 DIAGNOSIS — S43.432D TEAR OF LEFT GLENOID LABRUM, SUBSEQUENT ENCOUNTER: ICD-10-CM

## 2019-02-28 DIAGNOSIS — M25.512 LEFT SHOULDER PAIN, UNSPECIFIED CHRONICITY: ICD-10-CM

## 2019-02-28 DIAGNOSIS — S43.432A LABRAL TEAR OF SHOULDER, LEFT, INITIAL ENCOUNTER: Primary | ICD-10-CM

## 2019-02-28 PROCEDURE — 99024 POSTOP FOLLOW-UP VISIT: CPT | Performed by: ORTHOPAEDIC SURGERY

## 2019-02-28 NOTE — PROGRESS NOTES
"Swapnil Kyle is a 28 y.o. male is s/p       Chief Complaint   Patient presents with   • Left Shoulder - Post-op       HISTORY OF PRESENT ILLNESS: SHOULDER ARTHROSCOPY with revision anterior labral repair, SLAP repair, and subacromial decompression done on 2/20/2019. Physical therapy at [facility].    having pain at night still  Pain has been significant.    Allergies   Allergen Reactions   • Augmentin [Amoxicillin-Pot Clavulanate] Other (See Comments)     \"dont know\"   • Erythromycin Ethylsuccinate Other (See Comments)     Allergy to Pediazole   • Other Other (See Comments)     PEDIAZOLE   • Sulfa Antibiotics Rash         Current Outpatient Medications:   •  oxyCODONE-acetaminophen (PERCOCET) 7.5-325 MG per tablet, Take 1 tablet by mouth Every 4 (Four) Hours As Needed for Moderate Pain ., Disp: 30 tablet, Rfl: 0    No fevers or chills.  No nausea or vomiting.      PHYSICAL EXAMINATION:       Swapnil Kyle is a 28 y.o. male    Patient is awake and alert, answers questions appropriately and is in no apparent distress.    GAIT:     [x]  Normal  []  Antalgic    Assistive device: [x]  None  []  Walker     []  Crutches  []  Cane     []  Wheelchair  []  Stretcher    Left Shoulder Exam     Comments:  General soreness.  Mild swelling  No erythema                    ASSESSMENT:    Diagnoses and all orders for this visit:    Labral tear of shoulder, left,     Left shoulder pain, unspecified chronicity    Tear of left glenoid labrum, subsequent encounter          PLAN    Continue with PT and HEP  Slowly progress activity  Discussed slow progression through protocol due to general tissue laxity.   Aleve BID    Recheck in 4 weeks.    Return in about 4 weeks (around 3/28/2019) for recheck.    Salvador Hutchison MD    "

## 2019-03-01 ENCOUNTER — TELEPHONE (OUTPATIENT)
Dept: ORTHOPEDIC SURGERY | Facility: CLINIC | Age: 29
End: 2019-03-01

## 2019-03-01 DIAGNOSIS — S43.432A LABRAL TEAR OF SHOULDER, LEFT, INITIAL ENCOUNTER: Primary | ICD-10-CM

## 2019-03-01 RX ORDER — OXYCODONE AND ACETAMINOPHEN 7.5; 325 MG/1; MG/1
1 TABLET ORAL EVERY 6 HOURS PRN
Qty: 30 TABLET | Refills: 0 | Status: SHIPPED | OUTPATIENT
Start: 2019-03-01 | End: 2019-07-05

## 2019-03-01 NOTE — TELEPHONE ENCOUNTER
RANJEET        PT CALLED AN WANTED TO GET A REFILL OF OXYCODONE 7.5  LAST FILL 2/22/19      HE ALSO STATED HE FORGOT TO TELL YOU THAT HIS TONGUE WAS STILL NUMB AND IT BURNS WHEN HE DRINKS AND BRUSHES HIS TEETH..

## 2019-03-11 ENCOUNTER — TELEPHONE (OUTPATIENT)
Dept: ORTHOPEDIC SURGERY | Facility: CLINIC | Age: 29
End: 2019-03-11

## 2019-03-11 NOTE — TELEPHONE ENCOUNTER
Called patient to let him know that his prescription is ready for  and that he can continue his therapy as long as the pain is not too bad. If the pain is persistent and becomes too much he can take it easy per Dr. Serrano.

## 2019-03-11 NOTE — TELEPHONE ENCOUNTER
PATIENT CALLED REQUESTING A NEW PRESCRIPTION FOR PERCOCET 7.5/325.  ALSO, AT  LAST VISIT OF PHYSICAL THERAPY, HIS LEFT SHOULDER POPPED.  SHOULD HE DO THE NEXT TWO VISITS OF PHYSICAL THERAPY OR WAIT UNTIL HE SEES YOU ON THIS Thursday, 03/14/19?

## 2019-03-14 ENCOUNTER — OFFICE VISIT (OUTPATIENT)
Dept: ORTHOPEDIC SURGERY | Facility: CLINIC | Age: 29
End: 2019-03-14

## 2019-03-14 VITALS — HEIGHT: 69 IN | BODY MASS INDEX: 24.44 KG/M2 | WEIGHT: 165 LBS

## 2019-03-14 DIAGNOSIS — M25.512 LEFT SHOULDER PAIN, UNSPECIFIED CHRONICITY: Primary | ICD-10-CM

## 2019-03-14 DIAGNOSIS — S43.432D TEAR OF LEFT GLENOID LABRUM, SUBSEQUENT ENCOUNTER: ICD-10-CM

## 2019-03-14 PROCEDURE — 99024 POSTOP FOLLOW-UP VISIT: CPT | Performed by: ORTHOPAEDIC SURGERY

## 2019-03-14 RX ORDER — NAPROXEN SODIUM 220 MG
220 TABLET ORAL 2 TIMES DAILY PRN
COMMUNITY
End: 2019-07-05

## 2019-03-14 NOTE — PROGRESS NOTES
"Swapnil Kyle is a 28 y.o. male returns for     Chief Complaint   Patient presents with   • Left Shoulder - Follow-up       HISTORY OF PRESENT ILLNESS:  Follow up left shoulder post Labral repair  Surgery on 2/20/19.  Therapy at [facility] in [place].  Slowly progressing  Pain is improving.     CONCURRENT MEDICAL HISTORY:    The following portions of the patient's history were reviewed and updated as appropriate: allergies, current medications, past family history, past medical history, past social history, past surgical history and problem list.     ROS  No fevers or chills.  No chest pain or shortness of air.  No GI or  disturbances.    PHYSICAL EXAMINATION:       Ht 175.3 cm (69\")   Wt 74.8 kg (165 lb)   BMI 24.37 kg/m²     Physical Exam   Constitutional: He is oriented to person, place, and time. He appears well-developed and well-nourished.   Neurological: He is alert and oriented to person, place, and time.   Psychiatric: He has a normal mood and affect. His behavior is normal. Judgment and thought content normal.       GAIT:     [x]  Normal  []  Antalgic    Assistive device: [x]  None  []  Walker     []  Crutches  []  Cane     []  Wheelchair  []  Stretcher    Left Shoulder Exam     Comments:  Incisions well healed.  No problems  No erythema.                        ASSESSMENT:    Diagnoses and all orders for this visit:    Left shoulder pain, unspecified chronicity    Tear of left glenoid labrum, subsequent encounter    Other orders  -     naproxen sodium (ALEVE) 220 MG tablet; Take 220 mg by mouth 2 (Two) Times a Day As Needed.          PLAN    Continue ROM as tolerated  Continue use of abduction pillow for 4 weeks postop  Continue sling for 6-7 weeks postop  Slowly add motion as tolerated.  We discussed moving through protocol slowly.  Recheck in 4 weeks.    Return in about 4 weeks (around 4/11/2019) for recheck.    Salvador Hutchison MD  "

## 2019-03-19 ENCOUNTER — TELEPHONE (OUTPATIENT)
Dept: ORTHOPEDIC SURGERY | Facility: CLINIC | Age: 29
End: 2019-03-19

## 2019-03-19 DIAGNOSIS — S43.432D TEAR OF LEFT GLENOID LABRUM, SUBSEQUENT ENCOUNTER: Primary | ICD-10-CM

## 2019-03-20 DIAGNOSIS — S43.432A LABRAL TEAR OF SHOULDER, LEFT, INITIAL ENCOUNTER: Primary | ICD-10-CM

## 2019-03-20 RX ORDER — HYDROCODONE BITARTRATE AND ACETAMINOPHEN 7.5; 325 MG/1; MG/1
1 TABLET ORAL EVERY 6 HOURS PRN
Qty: 30 TABLET | Refills: 0 | Status: SHIPPED | OUTPATIENT
Start: 2019-03-20 | End: 2019-07-05

## 2019-03-20 NOTE — TELEPHONE ENCOUNTER
Spoke with patient in regards of pain medication,him wearing his sling and physical therapy. I let him know that we put an order in to switch the therapy and that he needs to wear his sling while in the air and also that his pain medication will be ready for  after 1:00pm today per Dr. Serrano.

## 2019-04-01 ENCOUNTER — TELEPHONE (OUTPATIENT)
Dept: ORTHOPEDIC SURGERY | Facility: CLINIC | Age: 29
End: 2019-04-01

## 2019-04-01 NOTE — TELEPHONE ENCOUNTER
Tran called from [facility] she is needing shoulder protocol for therapy. Fax number is [phone]

## 2019-04-10 ENCOUNTER — TELEPHONE (OUTPATIENT)
Dept: ORTHOPEDIC SURGERY | Facility: CLINIC | Age: 29
End: 2019-04-10

## 2019-04-10 NOTE — TELEPHONE ENCOUNTER
DR POLLACK.  [facility] FOR A PHYSICAL THERAPY PROTOCOL FOR A LEFT SHOULDER LABRUM TEAR/PLEASE FAX ORDER -[phone] OR CALL CIRILO BRYSON OR  [phone].

## 2019-07-05 ENCOUNTER — OFFICE VISIT (OUTPATIENT)
Dept: ORTHOPEDIC SURGERY | Facility: CLINIC | Age: 29
End: 2019-07-05

## 2019-07-05 VITALS — WEIGHT: 158.5 LBS | BODY MASS INDEX: 23.47 KG/M2 | HEIGHT: 69 IN

## 2019-07-05 DIAGNOSIS — M25.512 LEFT SHOULDER PAIN, UNSPECIFIED CHRONICITY: ICD-10-CM

## 2019-07-05 DIAGNOSIS — M25.571 RIGHT ANKLE PAIN, UNSPECIFIED CHRONICITY: ICD-10-CM

## 2019-07-05 DIAGNOSIS — S43.432A LABRAL TEAR OF SHOULDER, LEFT, INITIAL ENCOUNTER: Primary | ICD-10-CM

## 2019-07-05 DIAGNOSIS — M25.371 ANKLE INSTABILITY, RIGHT: ICD-10-CM

## 2019-07-05 PROCEDURE — 99213 OFFICE O/P EST LOW 20 MIN: CPT | Performed by: ORTHOPAEDIC SURGERY

## 2019-07-05 NOTE — PROGRESS NOTES
"Swapnil Kyle is a 28 y.o. male returns for     Chief Complaint   Patient presents with   • Left Shoulder - Follow-up       HISTORY OF PRESENT ILLNESS: Patient is here today for follow up of left shoulder. He had shoulder arthroscopy with revision of anterior labral repair, SLAP repair and subacromial decompression on 2/20/2019.  Patient states that his pain is 5/10.  The shoulder is better he has no feelings of loosening if anything it feels tighter to him still gets a little tight but overall the shoulder is improving.  He had a history of chronic ankle sprains as a significant injury back in 2011 was told he needed a ligament reconstruction at that time when he was prepped playing some college basketball.  Is bothered most recently had a recent negative x-ray but still continues to give way.  This is a 8-year-old problem.  It never really got well.  He is never had any further studies but did have an MRI scan 6 years ago that had abnormalities.       CONCURRENT MEDICAL HISTORY:    Past Medical History:   Diagnosis Date   • Acute pharyngitis    • Allergic rhinitis    • Allergic rhinitis due to pollen    • Anemia    • Aphthous ulcer of mouth    • Asthma     childhood   • Backache    • Common cold    • Cough    • Disorder of skin    • Dysuria    • Exanthematous disorder     numular eczema    • History of ITP    • History of regular medication use     long term    • History of respiratory therapy 03/07/2011    Nebulizer Treatment 73714 (1) - REJI Arredondo   • Hyperglycemia    • Hyperuricemia    • Increased frequency of urination    • Malaise and fatigue    • PONV (postoperative nausea and vomiting)    • Short stature disorder    • Thrombocytopenic purpura (CMS/HCC)    • Type II diabetes mellitus, uncontrolled (CMS/HCC)     diet controlled       Allergies   Allergen Reactions   • Augmentin [Amoxicillin-Pot Clavulanate] Other (See Comments)     \"dont know\"   • Erythromycin Ethylsuccinate Other (See Comments)     " "Allergy to Pediazole   • Other Other (See Comments)     PEDIAZOLE   • Sulfa Antibiotics Rash       No current outpatient medications on file.    Past Surgical History:   Procedure Laterality Date   • MYRINGOTOMY Bilateral 06/23/1992    TUBE IMPLANT GENERAL ANESTHETIC 66614 (1) - Bilateral myringotomies w/ Fernández ventilating tube implants. examination of adenoids   • NOSE SURGERY  02/22/2002    Removal of the nasal packing under general anesthetic & inspection of nasal cavity. Patient has nasal packing that needs to be removed   • OTHER SURGICAL HISTORY  03/07/2011    Biopsy, Each Additional Lesion 31567 (1) - REJI Rincongurmeet   • SHOULDER ARTHROSCOPY W/ LABRAL REPAIR Left 2/20/2019    Procedure: SHOULDER ARTHROSCOPY with revision anterior labral repair, SLAP repair, and subacromial decompression;  Surgeon: Salvador Hutchison MD;  Location: [facility];  Service: Orthopedics   • SHOULDER ARTHROSCOPY W/ SUPERIOR LABRAL ANTERIOR POSTERIOR REPAIR Left 8/30/2018    Procedure: DIAGNOSTIC ARTHROSCOPY LEFT SHOULDER WITH LABRAL REPAIR AS INDICATED;  Surgeon: Lawrence Serrano MD;  Location: [facility] OR;  Service: Orthopedics   • SHOULDER ARTHROSCOPY WITH SUBACROMIAL DECOMPRESSION Left 2/20/2019    Procedure: POSSIBLE CAPSULAR PLICATION  ANTERIORLY, POSTERIOR  PROCEDURE AS INDICATED           (PAC BUS);  Surgeon: Salvador Hutchison MD;  Location: [facility];  Service: Orthopedics   • SPLENECTOMY     • TONSILLECTOMY AND ADENOIDECTOMY Bilateral 10/19/1993    Bilateral myringotomies with Durivent tube implants. T&A. Chronic recurrent bilateral secretory otitis media. Marked enlargement of the tonsils & adenoids causing infection           ROS: Review of systems has been updated as of today's date.  All other systems are negative except as noted previously.    PHYSICAL EXAMINATION:       Ht 175.3 cm (69\")   Wt 71.9 kg (158 lb 8 oz)   BMI 23.41 kg/m²     Physical Exam   Constitutional: He is oriented to person, place, and " time. He appears well-developed.   HENT:   Head: Normocephalic and atraumatic.   Eyes: EOM are normal. Pupils are equal, round, and reactive to light.   Neck: Neck supple. No tracheal deviation present.   Pulmonary/Chest: Effort normal.   Musculoskeletal: He exhibits edema and tenderness. He exhibits no deformity.   Neurological: He is alert and oriented to person, place, and time.   Skin: Skin is warm and dry. No erythema.   Psychiatric: He has a normal mood and affect.       GAIT:     [x]  Normal  []  Antalgic    Assistive device: []  None  []  Walker     []  Crutches  []  Cane     []  Wheelchair  []  Stretcher    Ortho Exam  Anteromedial with ankle mild swelling is noted.  Drawer test is negative tender anterolaterally.  Heel cord is intact neurovascular intact.  Good motion of the shoulder little limitation in external  rotation and mildly positive apprehension sign.  Neurologically intact here.    No results found.          ASSESSMENT:    Diagnoses and all orders for this visit:    Labral tear of shoulder, left, initial encounter    Left shoulder pain, unspecified chronicity    Right ankle pain, unspecified chronicity  -     MRI Ankle Right Without Contrast; Future    Ankle instability, right          PLAN at this point will get a scan of his ankle.  With regard to her shoulder I think he is doing better affect discussed with him we will take him a year to get over this I believe overall this is much better than before do his exercises at home we will see him back to scan of his ankle    No Follow-up on file.      This document has been electronically signed by Lawrence Serrano MD on July 5, 2019 12:13 PM

## 2019-07-29 ENCOUNTER — HOSPITAL ENCOUNTER (OUTPATIENT)
Dept: MRI IMAGING | Facility: HOSPITAL | Age: 29
Discharge: HOME | End: 2019-07-29
Admitting: ORTHOPAEDIC SURGERY

## 2019-07-29 ENCOUNTER — APPOINTMENT (OUTPATIENT)
Dept: MRI IMAGING | Facility: HOSPITAL | Age: 29
End: 2019-07-29

## 2019-07-29 DIAGNOSIS — M25.571 RIGHT ANKLE PAIN, UNSPECIFIED CHRONICITY: ICD-10-CM

## 2019-07-29 PROCEDURE — 73721 MRI JNT OF LWR EXTRE W/O DYE: CPT

## 2021-01-20 DIAGNOSIS — M25.512 LEFT SHOULDER PAIN, UNSPECIFIED CHRONICITY: Primary | ICD-10-CM

## 2021-01-22 ENCOUNTER — OFFICE VISIT (OUTPATIENT)
Dept: ORTHOPEDIC SURGERY | Facility: CLINIC | Age: 31
End: 2021-01-22

## 2021-01-22 VITALS — BODY MASS INDEX: 25.59 KG/M2 | WEIGHT: 172.8 LBS | HEIGHT: 69 IN

## 2021-01-22 DIAGNOSIS — S83.002A PATELLAR SUBLUXATION, LEFT, INITIAL ENCOUNTER: ICD-10-CM

## 2021-01-22 DIAGNOSIS — M25.562 ACUTE PAIN OF LEFT KNEE: Primary | ICD-10-CM

## 2021-01-22 DIAGNOSIS — G89.29 CHRONIC PAIN OF LEFT KNEE: Primary | ICD-10-CM

## 2021-01-22 DIAGNOSIS — R22.42 MASS OF LEFT KNEE: ICD-10-CM

## 2021-01-22 DIAGNOSIS — M25.562 CHRONIC PAIN OF LEFT KNEE: Primary | ICD-10-CM

## 2021-01-22 PROCEDURE — 99214 OFFICE O/P EST MOD 30 MIN: CPT | Performed by: ORTHOPAEDIC SURGERY

## 2021-01-22 NOTE — PROGRESS NOTES
Swapnil Kyle is a 30 y.o. male   Primary provider:  Provider, No Known       Chief Complaint   Patient presents with   • Left Knee - Pain       HISTORY OF PRESENT ILLNESS: Patient is here today for new c/o left knee pain. He states that he had a basketball injury in 2013 and he dislocated his knee. He was seen in the ER for this. He states that he has waited and then had a MRI and ultrasound of his knee. He brought both with him today. He was sent to [facility] upon arrival. He states that his pain is 7/10.  In regards to his knee he states that he initially had an injury in 2013 while playing basketball.  He states that he was hit needed knee at the time and dislocated his kneecap.  He states that he had no MRI at the time.  He has had intermittent pain since then.  He states that he has more recently begun having a week, sliding feeling in his left knee.  He has had 2 steroid injections in the knee.  He reports no catching or locking.  He recently had an MRI and due to the findings on the MRI then also had an ultrasound.  No numbness or tingling.  No fevers or chills.      Pain  This is a chronic problem. The current episode started more than 1 year ago (2013). The problem occurs constantly. The problem has been gradually worsening. Associated symptoms include joint swelling. The symptoms are aggravated by walking and standing (Running). He has tried NSAIDs for the symptoms. The treatment provided mild relief.        CONCURRENT MEDICAL HISTORY:    Past Medical History:   Diagnosis Date   • Acute pharyngitis    • Allergic rhinitis    • Allergic rhinitis due to pollen    • Anemia    • Aphthous ulcer of mouth    • Asthma     childhood   • Backache    • Common cold    • Cough    • Disorder of skin    • Dysuria    • Exanthematous disorder     numular eczema    • History of ITP    • History of regular medication use     long term    • History of respiratory therapy 03/07/2011    Nebulizer Treatment 36823 (1) - M.  "Arnulfo   • Hyperglycemia    • Hyperuricemia    • Increased frequency of urination    • Malaise and fatigue    • PONV (postoperative nausea and vomiting)    • Short stature disorder    • Thrombocytopenic purpura (CMS/HCC)    • Type II diabetes mellitus, uncontrolled (CMS/HCC)     diet controlled       Allergies   Allergen Reactions   • Augmentin [Amoxicillin-Pot Clavulanate] Other (See Comments)     \"dont know\"   • Erythromycin Ethylsuccinate Other (See Comments)     Allergy to Pediazole   • Other Other (See Comments)     PEDIAZOLE   • Sulfa Antibiotics Rash       No current outpatient medications on file.    Past Surgical History:   Procedure Laterality Date   • MYRINGOTOMY Bilateral 06/23/1992    TUBE IMPLANT GENERAL ANESTHETIC 64027 (1) - Bilateral myringotomies w/ Fernández ventilating tube implants. examination of adenoids   • NOSE SURGERY  02/22/2002    Removal of the nasal packing under general anesthetic & inspection of nasal cavity. Patient has nasal packing that needs to be removed   • OTHER SURGICAL HISTORY  03/07/2011    Biopsy, Each Additional Lesion 52123 (1) - REJI Arredondo   • SHOULDER ARTHROSCOPY W/ LABRAL REPAIR Left 2/20/2019    Procedure: SHOULDER ARTHROSCOPY with revision anterior labral repair, SLAP repair, and subacromial decompression;  Surgeon: Salvador Hutchison MD;  Location: [facility] OR;  Service: Orthopedics   • SHOULDER ARTHROSCOPY W/ SUPERIOR LABRAL ANTERIOR POSTERIOR REPAIR Left 8/30/2018    Procedure: DIAGNOSTIC ARTHROSCOPY LEFT SHOULDER WITH LABRAL REPAIR AS INDICATED;  Surgeon: Lawrence Serrano MD;  Location: [facility] OR;  Service: Orthopedics   • SHOULDER ARTHROSCOPY WITH SUBACROMIAL DECOMPRESSION Left 2/20/2019    Procedure: POSSIBLE CAPSULAR PLICATION  ANTERIORLY, POSTERIOR  PROCEDURE AS INDICATED           (PAC BUS);  Surgeon: Salvador Hutchison MD;  Location: [facility] OR;  Service: Orthopedics   • SPLENECTOMY     • TONSILLECTOMY AND ADENOIDECTOMY Bilateral 10/19/1993    " "Bilateral myringotomies with Durivent tube implants. T&A. Chronic recurrent bilateral secretory otitis media. Marked enlargement of the tonsils & adenoids causing infection       Family History   Problem Relation Age of Onset   • Cancer Other    • Diabetes Other    • Heart disease Other    • Hypertension Other    • Ulcers Other    • Other Other         Bleeding tendency       Social History     Socioeconomic History   • Marital status: Single     Spouse name: Not on file   • Number of children: Not on file   • Years of education: Not on file   • Highest education level: Not on file   Tobacco Use   • Smoking status: Never Smoker   • Smokeless tobacco: Never Used   Substance and Sexual Activity   • Alcohol use: Yes     Comment: socially   • Drug use: No   • Sexual activity: Defer        Review of Systems   Constitutional: Negative.    HENT: Negative.    Eyes: Negative.    Respiratory: Negative.    Cardiovascular: Negative.    Gastrointestinal: Negative.    Endocrine: Negative.    Genitourinary: Negative.    Musculoskeletal: Positive for joint swelling.   Skin: Negative.    Allergic/Immunologic: Negative.    Neurological: Negative.    Hematological: Negative.    Psychiatric/Behavioral: Negative.        PHYSICAL EXAMINATION:       Ht 175.3 cm (69\")   Wt 78.4 kg (172 lb 12.8 oz)   BMI 25.52 kg/m²     Physical Exam  Constitutional:       General: He is not in acute distress.     Appearance: Normal appearance. He is well-developed. He is not ill-appearing.   Pulmonary:      Effort: Pulmonary effort is normal. No respiratory distress.   Neurological:      Mental Status: He is alert and oriented to person, place, and time.   Psychiatric:         Mood and Affect: Mood normal.         Behavior: Behavior normal.         Thought Content: Thought content normal.         Judgment: Judgment normal.         GAIT:     []  Normal  []  Antalgic    Assistive device: []  None  []  Walker     []  Crutches  []  Cane     []  " Wheelchair  []  Stretcher    Left Knee Exam     Comments:  He has good range of motion.  He does report tenderness with patellar glide and patellar mobility.  No joint line tenderness.  Good distal pulses and sensation.  Good muscle tone and strength.                      PROCEDURE:MRI KNEE LEFT WO CONTRAST  COMPARISON:None.  INDICATIONS:pain, instability  TECHNIQUE:MRI:  A complete multi-planar MRI was performed.      FINDINGS:  MEDIAL COMPARTMENT  MEDIAL MENISCUS:Normal.  No visible tear or significant degeneration.    HYALINE CARTILAGE:Normal.  No visible defect.    BONES:Normal.  No marrow pathology, fracture, or significant arthropathy.   MCL AND MEDIAL CAPSULE:Normal medial collateral ligament and medial capsule.      LATERAL COMPARTMENT  LATERAL MENISCUS:Normal.  No visible tear or significant degeneration.    HYALINE CARTILAGE:Normal.  No visible defect.    BONES:Normal.  No marrow pathology, fracture, or significant arthropathy.   LCL/POSTEROLAT. COMPLEX:Normal lateral collateral ligament, lateral capsule and ligaments.      ACL:Normal appearing ligament.    PCL:Normal appearing ligament.    PATELLOFEMORAL:Normal.    EFFUSION:Small knee joint effusion, without synovitis or loose bodies.  Trace fluid in the   expected location a popliteal cyst.  OTHER:High T2/low T1 signal structure is noted in the soft tissues superior to the popliteal   fossa, deep to the distal femoral shaft, measuring 2.1 x 1.5 cm in maximum dimensions.  This is   in close proximity to the vessels.    CONCLUSION:  1. Normal appearance of the patellofemoral joint.  2. Soft tissue structure in the deep soft tissues superior to the popliteal fossa, along the   distal femoral shaft.  This measures 2.1 x 1.5 cm.  This could be an abnormal lymph node or   other soft tissue mass.  Correlation with ultrasound recommended.  3.  Small knee joint effusion.          Sol Rose MD         Electronically Signed and Approved by: Sol Rose MD  on 01/08/2021 at 03:36 PM      Status Results Details     PROCEDURE: US EXTREMITY NONVASCULAR - LEFT  COMPARISON: [facility], MR, MRI KNEE LEFT WO CONTRAST, 01/08/2021, 02:38 PM.  INDICATIONS: soft tissue mass on MRI recommended US  TECHNIQUE: US:  Sonographic imaging of the soft tissues of the posterior distal femur/popliteal   fossa area was performed.     FINDINGS:   AREA OF CONCERN: SUPERIOR TO LEFT POP FOSSA  DESCRIPTION OF FINDINGS: HYPOECHOIC AREA WITH INTERNAL HYPERECHOIC AREA, APPEARANCE OF LYMPH   NODE (1.49 X 0.76 X 0.74 CM).  This has the appearance of a normal lymph node.    HYPOECHOIC nodule (1.93 X 1.43 X 0.89 CM) is seen slightly more superior to the normal   appearing lymph node, abutting the femoral shaft.        OTHER: UNABLE TO FIX ANNOTATIONS ON CINECLIP, SHOULD SAY LEFT INSTEAD OF RIGHT    CONCLUSION:   1. At the area of concern demonstrated on prior MRI of 1/8/2021, there is a hypoechoic soft   tissue nodule.  This has the appearance of an abnormal lymph node.  Given the sonographic and   MRI appearance, lymphadenitis could create this appearance.  Sonographic followup recommended   in 4 weeks to re-evaluate.  Special care should be taken on the follow-up ultrasound to ensure   that the correct area is imaged, as there are nearby normal lymph nodes as well.  Clinical   followup is recommended to evaluate for lymphadenopathy elsewhere.           Sol Rose MD         Electronically Signed and Approved by: Sol Rose MD on 01/21/2021 at 01:19 PM           ASSESSMENT:    Diagnoses and all orders for this visit:    Chronic pain of left knee  -     Ambulatory Referral to Physical Therapy Evaluate and treat  -     US Nonvascular Extremity Limited; Future    Patellar subluxation, left, initial encounter  -     Ambulatory Referral to Physical Therapy Evaluate and treat  -     US Nonvascular Extremity Limited; Future    Mass of left knee  -     Ambulatory Referral to Physical Therapy  Evaluate and treat  -     US Nonvascular Extremity Limited; Future          PLAN    We discussed beginning physical therapy with range of motion and strengthening of the left leg, patellar stabilizing exercises, VMO strengthening, and questionable use of a J brace.    We also discussed repeat ultrasound in 3 weeks to reassess the enlarged lymph node.  If the lymph node is not improved or if it is larger then he may need evaluation by hematology and/or fine-needle aspiration.    Otherwise, we will continue to monitor his improvement in symptoms with physical therapy and patellar support.    Follow-up in 4 weeks after the ultrasound.    Patient's Body mass index is 25.52 kg/m². BMI is within normal parameters. No follow-up required..  No follow-ups on file.    Salvador Hutchison MD

## 2021-02-19 ENCOUNTER — OFFICE VISIT (OUTPATIENT)
Dept: ORTHOPEDIC SURGERY | Facility: CLINIC | Age: 31
End: 2021-02-19

## 2021-02-19 ENCOUNTER — TELEPHONE (OUTPATIENT)
Dept: ORTHOPEDIC SURGERY | Facility: CLINIC | Age: 31
End: 2021-02-19

## 2021-02-19 VITALS — HEIGHT: 69 IN | WEIGHT: 169 LBS | BODY MASS INDEX: 25.03 KG/M2

## 2021-02-19 DIAGNOSIS — S83.002D PATELLAR SUBLUXATION, LEFT, SUBSEQUENT ENCOUNTER: ICD-10-CM

## 2021-02-19 DIAGNOSIS — S83.002A PATELLAR SUBLUXATION, LEFT, INITIAL ENCOUNTER: ICD-10-CM

## 2021-02-19 DIAGNOSIS — M25.562 CHRONIC PAIN OF LEFT KNEE: ICD-10-CM

## 2021-02-19 DIAGNOSIS — R22.42 MASS OF LEFT KNEE: Primary | ICD-10-CM

## 2021-02-19 DIAGNOSIS — G89.29 CHRONIC PAIN OF LEFT KNEE: ICD-10-CM

## 2021-02-19 DIAGNOSIS — M25.562 ACUTE PAIN OF LEFT KNEE: ICD-10-CM

## 2021-02-19 DIAGNOSIS — R22.42 MASS OF LEFT KNEE: ICD-10-CM

## 2021-02-19 PROCEDURE — 99213 OFFICE O/P EST LOW 20 MIN: CPT | Performed by: ORTHOPAEDIC SURGERY

## 2021-02-19 NOTE — TELEPHONE ENCOUNTER
Pt CALLED STATING HE GOT AN APPOINTMENT WITH MARTA GONSALEZ AN ONCOLOGIST @ [facility] IN [place] ON MARCH THE 5TH     Pt IS WANTING TO KNOW IF HE CAN SEE YOU SHORTLY AFTER THAT APPOINTMENT INSTEAD OF WAITING 6 WEEKS     HE STATES HE FEELS LIKE THERE ARE THINGS THAT HE DOES NOT UNDERSTAND OR THAT WAS LEFT UNADDRESSED     HE STATES HE WOULD DO A TELEPHONE VISIT IF HE DID NOT NEED TO COME IN PERSON     PLEASE ADVISE   Pt CALL BACK # [phone]

## 2021-02-19 NOTE — PROGRESS NOTES
"Swapnil Kyle is a 30 y.o. male returns for     Chief Complaint   Patient presents with   • Left Knee - Follow-up       HISTORY OF PRESENT ILLNESS:  Follow up left knee pain.  Pain level 7 today.    Patient brought in ultrasound from [facility]  2/11/21.    He continues to have pain in his left knee.  He has pain that is behind his knee and on the lateral aspect.    He is walking with a limp.    No fevers or chills.    No night sweats or unexplained weight loss.     CONCURRENT MEDICAL HISTORY:    The following portions of the patient's history were reviewed and updated as appropriate: allergies, current medications, past family history, past medical history, past social history, past surgical history and problem list.     ROS  No fevers or chills.  No chest pain or shortness of air.  No GI or  disturbances.    PHYSICAL EXAMINATION:       Ht 175.3 cm (69\")   Wt 76.7 kg (169 lb)   BMI 24.96 kg/m²     Physical Exam  Constitutional:       General: He is not in acute distress.     Appearance: Normal appearance. He is well-developed. He is not ill-appearing.   Pulmonary:      Effort: Pulmonary effort is normal. No respiratory distress.   Neurological:      Mental Status: He is alert and oriented to person, place, and time.   Psychiatric:         Mood and Affect: Mood normal.         Behavior: Behavior normal.         Thought Content: Thought content normal.         Judgment: Judgment normal.         GAIT:     [x]  Normal  []  Antalgic    Assistive device: [x]  None  []  Walker     []  Crutches  []  Cane     []  Wheelchair  []  Stretcher    Left Knee Exam     Comments:  He has good range of motion.  He does report tenderness with patellar glide and patellar mobility.  No joint line tenderness.  Good distal pulses and sensation.  Good muscle tone and strength.  Mild fullness in the back of his knee but no discernible mass palpated.  Good stability on exam.              Xr Knee 1 Or 2 View " Left    Result Date: 1/22/2021  Narrative: Ordering Provider:  Salvador Hutchison MD Ordering Diagnosis/Indication:  Acute pain of left knee Procedure:  XR KNEE 1 OR 2 VW LEFT Exam Date:  1/22/21 COMPARISON:  Not applicable, no relevant images available.     Impression:  AP bilateral standing of the knees with lateral of the left knee shows acceptable position and alignment with no evidence of acute bony abnormality.  No fracture or dislocation is noted.  Joint surfaces remain smooth and regular. Salvador Hutchison MD 1/22/21     Xr Knee Bilateral Ap Standing    Result Date: 1/22/2021  Narrative: Ordering Provider:  Salvador Hutchison MD Ordering Diagnosis/Indication:  Acute pain of left knee Procedure:  XR KNEE BILATERAL AP STANDING Exam Date:  1/22/21 COMPARISON:  Not applicable, no relevant images available.     Impression:  AP bilateral standing of the knees with lateral of the left knee shows acceptable position and alignment with no evidence of acute bony abnormality.  No fracture or dislocation is noted.  Joint surfaces remain smooth and regular. Salvador Hutchison MD 1/22/21       [facility]  US extremity NONvascular - LEFT  2/11/21    Impression: Similar appearance of two small hypoechoic areas adjacent to the popliteal vasculature.  These have the appearance most suggestive of lymph nodes, likely reactive. However, this remains indeterminate.  Follow up in three months is recommended to assess for resolution.      Signed by: Lex Cerda MD 2/11/21        ASSESSMENT:    Diagnoses and all orders for this visit:    Mass of left knee  -     US Nonvascular Extremity Limited; Future    Acute pain of left knee  -     US Nonvascular Extremity Limited; Future    Patellar subluxation, left, subsequent encounter  -     US Nonvascular Extremity Limited; Future          PLAN    Patient continues to have pain in his knee but with no discernible internal derangement identified on  MRI.  He does have 2 nodules posterior to his knee confirmed with ultrasound that have the appearance of lymph nodes.  We discussed continued observation for a period of 6 weeks with slowly progressing strength and conditioning as tolerated.  We discussed repeat ultrasound in 6 weeks to assess for change in the appearance of the masses in the posterior aspect of his knee.  We also discussed possibility of heme-onc or musculoskeletal oncology appointment depending on how things change in the next 6 weeks.    We also discussed that no orthopedic intervention is warranted at this time and certainly proceeding with diagnostic arthroscopy would not be warranted until a better idea of the etiology of the masses in the posterior aspect of his knee was identified.    I recommended protected weightbearing and slowly progressing as tolerated.    He called back after his appointment and stated that he had gotten an appointment to see oncology.  We can see him back after that appointment to discuss further treatment options and how to proceed.    We also discussed holding on physical therapy at this point until we have a better idea of the source of the issue.    Patient's Body mass index is 24.96 kg/m². BMI is above normal parameters. Recommendations include: exercise counseling and nutrition counseling.      Return in about 6 weeks (around 4/2/2021).    Salvador Hutchison MD

## 2021-03-10 ENCOUNTER — LAB (OUTPATIENT)
Dept: ONCOLOGY | Facility: HOSPITAL | Age: 31
End: 2021-03-10

## 2021-03-10 ENCOUNTER — TELEPHONE (OUTPATIENT)
Dept: ONCOLOGY | Facility: HOSPITAL | Age: 31
End: 2021-03-10

## 2021-03-10 ENCOUNTER — CONSULT (OUTPATIENT)
Dept: ONCOLOGY | Facility: CLINIC | Age: 31
End: 2021-03-10

## 2021-03-10 VITALS
RESPIRATION RATE: 16 BRPM | HEART RATE: 64 BPM | WEIGHT: 162.7 LBS | SYSTOLIC BLOOD PRESSURE: 121 MMHG | DIASTOLIC BLOOD PRESSURE: 76 MMHG | TEMPERATURE: 96.8 F | HEIGHT: 69 IN | BODY MASS INDEX: 24.1 KG/M2

## 2021-03-10 DIAGNOSIS — Z90.81 H/O SPLENECTOMY: ICD-10-CM

## 2021-03-10 DIAGNOSIS — Z86.2 HISTORY OF ITP: ICD-10-CM

## 2021-03-10 DIAGNOSIS — R59.1 LYMPHADENOPATHY: Primary | ICD-10-CM

## 2021-03-10 DIAGNOSIS — R59.1 LYMPHADENOPATHY: ICD-10-CM

## 2021-03-10 LAB
ALBUMIN SERPL-MCNC: 5.3 G/DL (ref 3.5–5.2)
ALBUMIN/GLOB SERPL: 2.9 G/DL
ALP SERPL-CCNC: 74 U/L (ref 39–117)
ALT SERPL W P-5'-P-CCNC: 42 U/L (ref 1–41)
ANION GAP SERPL CALCULATED.3IONS-SCNC: 8 MMOL/L (ref 5–15)
AST SERPL-CCNC: 36 U/L (ref 1–40)
BASOPHILS # BLD AUTO: 0.07 10*3/MM3 (ref 0–0.2)
BASOPHILS NFR BLD AUTO: 0.7 % (ref 0–1.5)
BILIRUB SERPL-MCNC: 0.7 MG/DL (ref 0–1.2)
BUN SERPL-MCNC: 25 MG/DL (ref 6–20)
BUN/CREAT SERPL: 21.9 (ref 7–25)
CALCIUM SPEC-SCNC: 10.4 MG/DL (ref 8.6–10.5)
CHLORIDE SERPL-SCNC: 104 MMOL/L (ref 98–107)
CO2 SERPL-SCNC: 27 MMOL/L (ref 22–29)
CREAT SERPL-MCNC: 1.14 MG/DL (ref 0.76–1.27)
DEPRECATED RDW RBC AUTO: 44.7 FL (ref 37–54)
EOSINOPHIL # BLD AUTO: 1.1 10*3/MM3 (ref 0–0.4)
EOSINOPHIL NFR BLD AUTO: 10.9 % (ref 0.3–6.2)
ERYTHROCYTE [DISTWIDTH] IN BLOOD BY AUTOMATED COUNT: 14.8 % (ref 12.3–15.4)
GFR SERPL CREATININE-BSD FRML MDRD: 75 ML/MIN/1.73
GLOBULIN UR ELPH-MCNC: 1.8 GM/DL
GLUCOSE SERPL-MCNC: 90 MG/DL (ref 65–99)
HCT VFR BLD AUTO: 43.3 % (ref 37.5–51)
HGB BLD-MCNC: 14.1 G/DL (ref 13–17.7)
HIV1+2 AB SER QL: NORMAL
IMM GRANULOCYTES # BLD AUTO: 0.02 10*3/MM3 (ref 0–0.05)
IMM GRANULOCYTES NFR BLD AUTO: 0.2 % (ref 0–0.5)
LYMPHOCYTES # BLD AUTO: 3.85 10*3/MM3 (ref 0.7–3.1)
LYMPHOCYTES NFR BLD AUTO: 38.1 % (ref 19.6–45.3)
MCH RBC QN AUTO: 27.1 PG (ref 26.6–33)
MCHC RBC AUTO-ENTMCNC: 32.6 G/DL (ref 31.5–35.7)
MCV RBC AUTO: 83.1 FL (ref 79–97)
MONOCYTES # BLD AUTO: 0.89 10*3/MM3 (ref 0.1–0.9)
MONOCYTES NFR BLD AUTO: 8.8 % (ref 5–12)
NEUTROPHILS NFR BLD AUTO: 4.18 10*3/MM3 (ref 1.7–7)
NEUTROPHILS NFR BLD AUTO: 41.3 % (ref 42.7–76)
NRBC BLD AUTO-RTO: 0 /100 WBC (ref 0–0.2)
PLATELET # BLD AUTO: 329 10*3/MM3 (ref 140–450)
PMV BLD AUTO: 11.9 FL (ref 6–12)
POTASSIUM SERPL-SCNC: 4.4 MMOL/L (ref 3.5–5.2)
PROT SERPL-MCNC: 7.1 G/DL (ref 6–8.5)
RBC # BLD AUTO: 5.21 10*6/MM3 (ref 4.14–5.8)
SODIUM SERPL-SCNC: 139 MMOL/L (ref 136–145)
WBC # BLD AUTO: 10.11 10*3/MM3 (ref 3.4–10.8)

## 2021-03-10 PROCEDURE — 86038 ANTINUCLEAR ANTIBODIES: CPT

## 2021-03-10 PROCEDURE — 99204 OFFICE O/P NEW MOD 45 MIN: CPT | Performed by: INTERNAL MEDICINE

## 2021-03-10 PROCEDURE — 83883 ASSAY NEPHELOMETRY NOT SPEC: CPT

## 2021-03-10 PROCEDURE — 82784 ASSAY IGA/IGD/IGG/IGM EACH: CPT

## 2021-03-10 PROCEDURE — G0463 HOSPITAL OUTPT CLINIC VISIT: HCPCS | Performed by: INTERNAL MEDICINE

## 2021-03-10 PROCEDURE — G0432 EIA HIV-1/HIV-2 SCREEN: HCPCS

## 2021-03-10 PROCEDURE — 84165 PROTEIN E-PHORESIS SERUM: CPT

## 2021-03-10 PROCEDURE — 80053 COMPREHEN METABOLIC PANEL: CPT

## 2021-03-10 PROCEDURE — 85025 COMPLETE CBC W/AUTO DIFF WBC: CPT

## 2021-03-10 PROCEDURE — 86334 IMMUNOFIX E-PHORESIS SERUM: CPT

## 2021-03-10 PROCEDURE — 82164 ANGIOTENSIN I ENZYME TEST: CPT

## 2021-03-10 RX ORDER — IBUPROFEN 200 MG
200 TABLET ORAL EVERY 6 HOURS PRN
COMMUNITY
End: 2021-04-04

## 2021-03-10 NOTE — PATIENT INSTRUCTIONS
Patient Instructions for CT Scan    · Your CT scan is being done without any oral contrast.  Your CT scan may be done with IV contrast, if you have an allergy to iodine--please tell your nurse.    · Do not eat or drink 4 hours prior to scan.     · You may take your medications with sips of water, except DO NOT take your diabetic pill the morning of the test.    Arrive at the Outpatient Surgery entrance at [facility] 20 minutes prior to appointment time.    You will receive a phone call with an appointment for your CT scan.  Please call our office, if someone does not contact you with 3 days.    Mary Wynne RN  March 10, 2021  13:42 CST

## 2021-03-10 NOTE — PROGRESS NOTES
REASON FOR CONSULTATION: Lymphadenopathy  Provide an opinion on any further workup or treatment                             REQUESTING PHYSICIAN:  Salvador Hutchison MD    RECORDS OBTAINED:  Records of the patients history including those obtained from the referring provider were reviewed and summarized in detail.        History of Present Illness     This is a pleasant 30-year-old male who was seen in consultation at the request of Dr Hutchison for evaluation of lymphadenopathy.  Patient unfortunately is a poor historian and most of the history was obtained reviewing old medical records.  Patient tells me that he has been recently undergoing evaluation for left knee pain which has been bothering him on and off.  He initially had an MRI left knee performed earlier in the year which incidentally showed soft tissue structure in the deep soft tissues superior to popliteal fossa along the distal femoral shaft.  This measured 2.1 x 1.5 cm.  Second ultrasound of left lower extremity showed the masslike area was enlarged lymph node.  Patient had a repeat ultrasound performed 4 to 5 weeks later which again showed stable mildly enlarged lymph node suspicious for reactive lymphadenopathy.  It does not appear that any of the lymph nodes were causing neurovascular compression and were only mildly enlarged.  Patient has been quite concerned about lymphadenopathy.  He does have history of lymphadenopathy involving his groin in the past and had excisional lymph node biopsy performed at outside hospital.  Patient has brought those records with him.  The biopsy was excisional lymph node biopsy and there was no evidence of any lymphoma or infection.  Final pathology read as reactive lymphadenopathy.  Patient denies any unintentional weight loss or drenching night sweats or high fevers.  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  No  other family history of any hematological disorder.  I been asked to assist with evaluation and management of his lymphadenopathy.        Past Medical History:   Diagnosis Date   • Acute pharyngitis    • Allergic rhinitis    • Allergic rhinitis due to pollen    • Anemia    • Aphthous ulcer of mouth    • Backache    • Common cold    • Cough    • Disorder of skin    • Dysuria    • Exanthematous disorder     numular eczema    • History of ITP    • History of regular medication use     long term    • History of respiratory therapy 03/07/2011    Nebulizer Treatment 86381 (1) - REJI Arredondo   • Hyperglycemia    • Hyperuricemia    • Increased frequency of urination    • Malaise and fatigue    • PONV (postoperative nausea and vomiting)    • Short stature disorder    • Thrombocytopenic purpura (CMS/HCC)    • Type II diabetes mellitus, uncontrolled (CMS/HCC)     diet controlled        Past Surgical History:   Procedure Laterality Date   • MYRINGOTOMY Bilateral 06/23/1992    TUBE IMPLANT GENERAL ANESTHETIC 25118 (1) - Bilateral myringotomies w/ Fernández ventilating tube implants. examination of adenoids   • NOSE SURGERY  02/22/2002    Removal of the nasal packing under general anesthetic & inspection of nasal cavity. Patient has nasal packing that needs to be removed   • OTHER SURGICAL HISTORY  03/07/2011    Biopsy, Each Additional Lesion 56856 (1) - REJI Arredondo   • SHOULDER ARTHROSCOPY W/ LABRAL REPAIR Left 2/20/2019    Procedure: SHOULDER ARTHROSCOPY with revision anterior labral repair, SLAP repair, and subacromial decompression;  Surgeon: Salvador Hutchison MD;  Location: [facility];  Service: Orthopedics   • SHOULDER ARTHROSCOPY W/ SUPERIOR LABRAL ANTERIOR POSTERIOR REPAIR Left 8/30/2018    Procedure: DIAGNOSTIC ARTHROSCOPY LEFT SHOULDER WITH LABRAL REPAIR AS INDICATED;  Surgeon: Lawrence Serrano MD;  Location: [facility] OR;  Service: Orthopedics   • SHOULDER ARTHROSCOPY WITH SUBACROMIAL DECOMPRESSION Left 2/20/2019     "Procedure: POSSIBLE CAPSULAR PLICATION  ANTERIORLY, POSTERIOR  PROCEDURE AS INDICATED           (PAC BUS);  Surgeon: Salvador Hutchison MD;  Location: [facility];  Service: Orthopedics   • SPLENECTOMY     • TONSILLECTOMY AND ADENOIDECTOMY Bilateral 10/19/1993    Bilateral myringotomies with Durivent tube implants. T&A. Chronic recurrent bilateral secretory otitis media. Marked enlargement of the tonsils & adenoids causing infection        Current Outpatient Medications on File Prior to Visit   Medication Sig Dispense Refill   • ibuprofen (ADVIL,MOTRIN) 200 MG tablet Take 200 mg by mouth Every 6 (Six) Hours As Needed for Mild Pain .       No current facility-administered medications on file prior to visit.        ALLERGIES:    Allergies   Allergen Reactions   • Augmentin [Amoxicillin-Pot Clavulanate] Other (See Comments)     \"dont know\"   • Erythromycin Ethylsuccinate Other (See Comments)     Allergy to Pediazole   • Other Other (See Comments)     PEDIAZOLE   • Sulfa Antibiotics Rash        Social History     Socioeconomic History   • Marital status: Single     Spouse name: Not on file   • Number of children: Not on file   • Years of education: Not on file   • Highest education level: Not on file   Tobacco Use   • Smoking status: Never Smoker   • Smokeless tobacco: Never Used   Substance and Sexual Activity   • Alcohol use: Yes     Comment: socially   • Drug use: No   • Sexual activity: Defer        Family History   Problem Relation Age of Onset   • Cancer Other    • Diabetes Other    • Heart disease Other    • Hypertension Other    • Ulcers Other    • Other Other         Bleeding tendency   • No Known Problems Mother    • No Known Problems Father    • No Known Problems Sister    • No Known Problems Sister    • No Known Problems Sister    • No Known Problems Sister             Objective     Vitals:    03/10/21 1306   BP: 121/76   Pulse: 64   Resp: 16   Temp: 96.8 °F (36 °C)   Weight: 73.8 kg (162 lb 11.2 oz) " "  Height: 175.3 cm (69\")   PainSc:   8   PainLoc: Knee     No flowsheet data found.    Physical Exam  Vitals and nursing note reviewed.   Constitutional:       Appearance: Normal appearance. He is not ill-appearing.   HENT:      Nose: Nose normal. No congestion.      Mouth/Throat:      Mouth: Mucous membranes are moist.      Pharynx: No oropharyngeal exudate or posterior oropharyngeal erythema.   Eyes:      General: No scleral icterus.     Extraocular Movements: Extraocular movements intact.      Pupils: Pupils are equal, round, and reactive to light.   Cardiovascular:      Rate and Rhythm: Normal rate and regular rhythm.      Pulses: Normal pulses.      Heart sounds: No murmur.   Pulmonary:      Effort: Pulmonary effort is normal. No respiratory distress.      Breath sounds: Normal breath sounds. No stridor. No wheezing.   Abdominal:      General: There is no distension.      Palpations: Abdomen is soft. There is no mass.      Tenderness: There is no abdominal tenderness.   Musculoskeletal:         General: Normal range of motion.      Cervical back: Normal range of motion and neck supple. No rigidity or tenderness.      Right lower leg: No edema.      Left lower leg: No edema.   Lymphadenopathy:      Upper Body:      Right upper body: Axillary adenopathy present.      Left upper body: Axillary adenopathy present.      Lower Body: Right inguinal adenopathy present. Left inguinal adenopathy present.   Neurological:      General: No focal deficit present.      Mental Status: He is alert and oriented to person, place, and time. Mental status is at baseline.   Psychiatric:         Mood and Affect: Mood normal.         Behavior: Behavior normal.         Thought Content: Thought content normal.               RECENT LABS:Independently reviewed and summarized  Hematology WBC   Date Value Ref Range Status   03/10/2021 10.11 3.40 - 10.80 10*3/mm3 Final   06/10/2020 10.4 4.1 - 10.9 THOUS/uL Final     RBC   Date Value Ref " Range Status   03/10/2021 5.21 4.14 - 5.80 10*6/mm3 Final   06/10/2020 5.38 3.35 - 5.50 MIL/uL Final     Hemoglobin   Date Value Ref Range Status   03/10/2021 14.1 13.0 - 17.7 g/dL Final   06/10/2020 14.6 12.9 - 16.6 GM/DL Final     Hematocrit   Date Value Ref Range Status   03/10/2021 43.3 37.5 - 51.0 % Final   06/10/2020 45.7 38.0 - 48.0 % Final     Platelets   Date Value Ref Range Status   03/10/2021 329 140 - 450 10*3/mm3 Final   06/10/2020 352 130 - 400 THOUS/uL Final        Lab Results   Component Value Date    GLUCOSE 90 03/10/2021    BUN 25 (H) 03/10/2021    CREATININE 1.14 03/10/2021    EGFRIFNONA 75 03/10/2021    BCR 21.9 03/10/2021    K 4.4 03/10/2021    CO2 27.0 03/10/2021    CALCIUM 10.4 03/10/2021    ALBUMIN 5.30 (H) 03/10/2021    AST 36 03/10/2021    ALT 42 (H) 03/10/2021         Imaging (independently reviewed and summarized):   Left lower leg ultrasound From February 11, 2021 reviewed.  This showed 2 small hypoechoic area adjacent to popliteal vasculature.  Suggestive of lymph node.  Likely reactive.    Left knee MRI without contrast from January 8, 2021 reviewed.  This showed soft tissue structure in the deep soft tissues superior to popliteal fossa along the distal femoral shaft.  This measured 2.1 x 1.5 cm.  Ultrasound this appeared to be reactive lymph node versus enlarged lymph node.      Pathology (result reviewed):   Excisional lymph node biopsy, left groin  Reactive lymph node  Microscopic sections demonstrate follicular hyperplasia with varying sized germinal center.  Flow cytometry was normal.  Cytogenetics was normal.  Negative for Bartonella henselae, fungal infection.      I have reviewed old records and summarized them in HPI as well as assessment and plan section of this note.     Swapnil Kyle reports a pain score of 8.  Given his pain assessment as noted, treatment options were discussed and the following options were decided upon as a follow-up plan to address the  patient's pain: referral to Primary Care for assistance in pain treatment guidance.    Patient screened positive for depression based on a PHQ-9 score of 0 on 3/10/2021. Follow-up recommendations include: Suicide Risk Assessment performed.      Diagnosis:   (1) Generalized lymphadenopathy   (2) Left knee pain   (3) History of ITP   (4) Status post splenectomy     All are new diagnosis/problems for me.     Assessment/Plan     (1) Generalized lymphadenopathy     Patient with longstanding history of generalized lymphadenopathy of unknown etiology.  Patient obviously quite concerned about this being malignant.  Patient does have history of excisional lymph node biopsy from right groin area performed.  I reviewed the pathology report which showed reactive lymphadenopathy without any evidence of malignancy or infection.  It is quite possible that his generalized lymphadenopathy is reactive however we will rule out other potential causes.  I will check CT scan of chest, abdomen pelvis to look for any other area of lymphadenopathy.  I will check angiotensin-converting enzyme to check for sarcoidosis.  I will check peripheral blood flow cytometry to rule out lymphoproliferative disorder.  I will check serum protein electrophoresis, immunofixation and free light chains to rule out plasma cell disorder.  I will check MARC to rule out autoimmune causes.  He does not have any risk factor for HIV however given unexplained generalized lymphadenopathy I will check HIV serology.  Patient understood and was agreeable to work-up as outlined above.      (2) Left knee pain   I am not entirely certain the etiology behind his left knee pain.  He does have a couple of enlarged lymph node but this is not compressing against neurovascular bundle.  He has already worked with physical therapy without much improvement.  Discussed with patient that if the remainder work-up for lymphadenopathy comes back negative surgery to remove this lymph node  might be an option but I am not entirely certain that his left knee pain is related to mildly enlarged lymph node superior to popliteal fossa.    (3) History of ITP (4) Status post splenectomy   Patient with history of ITP as a kid underwent splenectomy many years ago.  He is in remission since then.

## 2021-03-11 ENCOUNTER — APPOINTMENT (OUTPATIENT)
Dept: ONCOLOGY | Facility: HOSPITAL | Age: 31
End: 2021-03-11

## 2021-03-11 ENCOUNTER — APPOINTMENT (OUTPATIENT)
Dept: ONCOLOGY | Facility: CLINIC | Age: 31
End: 2021-03-11

## 2021-03-11 LAB
ACE SERPL-CCNC: 38 U/L (ref 14–82)
ALBUMIN SERPL ELPH-MCNC: 5 G/DL (ref 2.9–4.4)
ALBUMIN/GLOB SERPL: 2.2 {RATIO} (ref 0.7–1.7)
ALPHA1 GLOB SERPL ELPH-MCNC: 0.2 G/DL (ref 0–0.4)
ALPHA2 GLOB SERPL ELPH-MCNC: 0.7 G/DL (ref 0.4–1)
ANA SER QL: NEGATIVE
B-GLOBULIN SERPL ELPH-MCNC: 1 G/DL (ref 0.7–1.3)
GAMMA GLOB SERPL ELPH-MCNC: 0.4 G/DL (ref 0.4–1.8)
GLOBULIN SER CALC-MCNC: 2.3 G/DL (ref 2.2–3.9)
IGA SERPL-MCNC: 8 MG/DL (ref 90–386)
IGG SERPL-MCNC: 393 MG/DL (ref 603–1613)
IGM SERPL-MCNC: 35 MG/DL (ref 20–172)
KAPPA LC FREE SER-MCNC: 4.1 MG/L (ref 3.3–19.4)
KAPPA LC FREE/LAMBDA FREE SER: 1.05 {RATIO} (ref 0.26–1.65)
LABORATORY COMMENT REPORT: ABNORMAL
LAMBDA LC FREE SERPL-MCNC: 3.9 MG/L (ref 5.7–26.3)
M PROTEIN SERPL ELPH-MCNC: ABNORMAL G/DL
PROT PATTERN SERPL ELPH-IMP: ABNORMAL
PROT PATTERN SERPL IFE-IMP: ABNORMAL
PROT SERPL-MCNC: 7.3 G/DL (ref 6–8.5)
REF LAB TEST METHOD: NORMAL

## 2021-03-12 ENCOUNTER — APPOINTMENT (OUTPATIENT)
Dept: CT IMAGING | Facility: HOSPITAL | Age: 31
End: 2021-03-12

## 2021-03-19 ENCOUNTER — APPOINTMENT (OUTPATIENT)
Dept: CT IMAGING | Facility: HOSPITAL | Age: 31
End: 2021-03-19

## 2021-03-25 ENCOUNTER — HOSPITAL ENCOUNTER (OUTPATIENT)
Dept: CT IMAGING | Facility: HOSPITAL | Age: 31
Discharge: HOME | End: 2021-03-25
Admitting: INTERNAL MEDICINE

## 2021-03-25 DIAGNOSIS — R59.1 LYMPHADENOPATHY: ICD-10-CM

## 2021-03-25 PROCEDURE — 25010000002 IOPAMIDOL 61 % SOLUTION: Performed by: INTERNAL MEDICINE

## 2021-03-25 PROCEDURE — 74177 CT ABD & PELVIS W/CONTRAST: CPT

## 2021-03-25 PROCEDURE — 71260 CT THORAX DX C+: CPT

## 2021-03-25 RX ADMIN — IOPAMIDOL 80 ML: 612 INJECTION, SOLUTION INTRAVENOUS at 16:26

## 2021-03-29 ENCOUNTER — APPOINTMENT (OUTPATIENT)
Dept: ONCOLOGY | Facility: CLINIC | Age: 31
End: 2021-03-29

## 2021-04-02 ENCOUNTER — OFFICE VISIT (OUTPATIENT)
Dept: ONCOLOGY | Facility: CLINIC | Age: 31
End: 2021-04-02

## 2021-04-02 VITALS
WEIGHT: 157.4 LBS | RESPIRATION RATE: 18 BRPM | BODY MASS INDEX: 23.31 KG/M2 | HEART RATE: 64 BPM | SYSTOLIC BLOOD PRESSURE: 120 MMHG | DIASTOLIC BLOOD PRESSURE: 76 MMHG | TEMPERATURE: 97.8 F | HEIGHT: 69 IN

## 2021-04-02 DIAGNOSIS — R59.1 LYMPHADENOPATHY: ICD-10-CM

## 2021-04-02 PROCEDURE — 99213 OFFICE O/P EST LOW 20 MIN: CPT | Performed by: INTERNAL MEDICINE

## 2021-04-02 PROCEDURE — G0463 HOSPITAL OUTPT CLINIC VISIT: HCPCS | Performed by: INTERNAL MEDICINE

## 2021-04-02 RX ORDER — HYDROCODONE BITARTRATE AND ACETAMINOPHEN 5; 325 MG/1; MG/1
TABLET ORAL
COMMUNITY
Start: 2021-03-18 | End: 2021-04-04

## 2021-04-02 NOTE — PROGRESS NOTES
"  Subjective     Swapnil Kyle was seen in follow-up for inguinal adenopathy.  We reviewed the result of CT scan which is showing adenopathy in his abdomen and inguinal region.  I remain suspicious for low-grade lymphoma.  He will need excisional lymph node biopsy.  We will discuss with surgery to get excisional lymph node biopsy of the groin nodes.    Past Medical History, Past Surgical History, Social History, Family History have been reviewed and are without significant changes except as mentioned.        Medications:  The current medication list was reviewed in the EMR    ALLERGIES:    Allergies   Allergen Reactions   • Augmentin [Amoxicillin-Pot Clavulanate] Other (See Comments)     \"dont know\"   • Erythromycin Ethylsuccinate Other (See Comments)     Allergy to Pediazole   • Other Other (See Comments)     PEDIAZOLE   • Sulfa Antibiotics Rash       Objective      Vitals:    04/02/21 1452   BP: 120/76   Pulse: 64   Resp: 18   Temp: 97.8 °F (36.6 °C)   TempSrc: Temporal   Weight: 71.4 kg (157 lb 6.4 oz)   Height: 175.3 cm (69.02\")   PainSc: 0-No pain     Current Status 4/2/2021   ECOG score 0       Physical Exam  Vitals and nursing note reviewed.   Constitutional:       Appearance: Normal appearance.   Lymphadenopathy:      Lower Body: Right inguinal adenopathy present. Left inguinal adenopathy present.   Neurological:      Mental Status: He is alert.               RECENT LABS:Independently reviewed and summarized  Hematology WBC   Date Value Ref Range Status   03/10/2021 10.11 3.40 - 10.80 10*3/mm3 Final   06/10/2020 10.4 4.1 - 10.9 THOUS/uL Final     RBC   Date Value Ref Range Status   03/10/2021 5.21 4.14 - 5.80 10*6/mm3 Final   06/10/2020 5.38 3.35 - 5.50 MIL/uL Final     Hemoglobin   Date Value Ref Range Status   03/10/2021 14.1 13.0 - 17.7 g/dL Final   06/10/2020 14.6 12.9 - 16.6 GM/DL Final     Hematocrit   Date Value Ref Range Status   03/10/2021 43.3 37.5 - 51.0 % Final   06/10/2020 45.7 38.0 - " 48.0 % Final     Platelets   Date Value Ref Range Status   03/10/2021 329 140 - 450 10*3/mm3 Final   06/10/2020 352 130 - 400 THOUS/uL Final              Diagnosis:   (1) Lymphadenopathy     Assessment/Plan     Swapnil Kyle was seen in follow-up for lymphadenopathy.    I reviewed the result of CT scan of chest, abdomen pelvis with patient.  This is showing multiple enlarged lymph node.  Largest lymph node is measuring 3.18 x 1.89 cm in hiral hepatic region.  Multiple para-aortic, external iliac and bilateral inguinal adenopathy.    Patient certainly is very anxious about figuring out what is causing his lymphadenopathy.    Discussed with patient that differentials remain broad.  HIV negative.   ACE - normal.   MARC normal.   Even though he had excisional lymph node biopsy performed of left groin in the past (2019) and reported to be normal at outside clinic? there were certain abnormalities on that specimen that raises suspicious for possible low-grade lymphoma such as CD20, BCL6 expression with certain areas of high Ki-67.    He has multiple enlarged inguinal lymph nodes that should be easily accessible for excisional lymph node biopsy.  Needle biopsy likely will be low yield and I do believe he will need excisional lymph node biopsy eventually.    I also discussed with him about possibility of sending his pathology specimen to [facility] as he does have low IgG, IgA which could potentially be associated with immunological disorder.      Recommendations:   · General surgery consultation for excisional lymph node biopsy.    We will follow up after the result of biopsy.        4/2/2021      CC:

## 2021-04-06 ENCOUNTER — TELEPHONE (OUTPATIENT)
Dept: ONCOLOGY | Facility: CLINIC | Age: 31
End: 2021-04-06

## 2021-04-06 NOTE — TELEPHONE ENCOUNTER
Called patient and explained the biopsy. Explained rationale for inguinal node biopsy. He understood and was agreeable.

## 2021-04-09 ENCOUNTER — OFFICE VISIT (OUTPATIENT)
Dept: SURGERY | Facility: CLINIC | Age: 31
End: 2021-04-09

## 2021-04-09 ENCOUNTER — PRE-ADMISSION TESTING (OUTPATIENT)
Dept: PREADMISSION TESTING | Facility: HOSPITAL | Age: 31
End: 2021-04-09

## 2021-04-09 VITALS
DIASTOLIC BLOOD PRESSURE: 76 MMHG | TEMPERATURE: 98 F | HEIGHT: 69 IN | WEIGHT: 156 LBS | HEART RATE: 82 BPM | BODY MASS INDEX: 23.11 KG/M2 | SYSTOLIC BLOOD PRESSURE: 112 MMHG

## 2021-04-09 DIAGNOSIS — R59.1 LYMPHADENOPATHY: Primary | ICD-10-CM

## 2021-04-09 PROCEDURE — 99204 OFFICE O/P NEW MOD 45 MIN: CPT | Performed by: SURGERY

## 2021-04-09 RX ORDER — SODIUM CHLORIDE 9 MG/ML
100 INJECTION, SOLUTION INTRAVENOUS CONTINUOUS
Status: CANCELLED | OUTPATIENT
Start: 2021-04-14

## 2021-04-09 NOTE — H&P (VIEW-ONLY)
CHIEF COMPLAINT:    Lymphadenopathy    HISTORY OF PRESENT ILLNESS:    Swapnil Kyle is a 30 y.o. 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  He is referred today by Dr. Rowe who recently saw the patient for diffuse lymphadenopathy.  This was demonstrated on physical exam as well as CT imaging.  Patient notes these palpable lymph nodes have persisted despite steroid and antibiotic therapy.  They have been present for several weeks.  He also notes intermittent night sweats several times per week.  He notes mild left groin discomfort due to the enlarged nodes in this region.    Past Medical History:   Diagnosis Date   • Acute pharyngitis    • Allergic rhinitis    • Allergic rhinitis due to pollen    • Anemia    • Aphthous ulcer of mouth    • Backache    • Common cold    • Cough    • Disorder of skin    • Dysuria    • Exanthematous disorder     numular eczema    • History of ITP    • History of regular medication use     long term    • History of respiratory therapy 03/07/2011    Nebulizer Treatment 02667 (1) - REJI Arredondo   • Hyperuricemia    • Increased frequency of urination    • Malaise and fatigue    • PONV (postoperative nausea and vomiting)    • Short stature disorder    • Thrombocytopenic purpura (CMS/HCC)        Past Surgical History:   Procedure Laterality Date   • MYRINGOTOMY Bilateral 06/23/1992    TUBE IMPLANT GENERAL ANESTHETIC 89764 (1) - Bilateral myringotomies w/ Fernández ventilating tube implants. examination of adenoids   • NOSE SURGERY  02/22/2002    Removal of the nasal packing under general anesthetic & inspection of nasal cavity. Patient has nasal packing that needs to be removed   • OTHER SURGICAL HISTORY  03/07/2011    Biopsy, Each Additional Lesion 11453 (1) - REJI Arredondo   • SHOULDER ARTHROSCOPY W/ LABRAL REPAIR Left 2/20/2019    Procedure: SHOULDER ARTHROSCOPY with revision anterior labral repair, SLAP repair, and subacromial decompression;  Surgeon:  "Salvador Htuchison MD;  Location: [facility];  Service: Orthopedics   • SHOULDER ARTHROSCOPY W/ SUPERIOR LABRAL ANTERIOR POSTERIOR REPAIR Left 8/30/2018    Procedure: DIAGNOSTIC ARTHROSCOPY LEFT SHOULDER WITH LABRAL REPAIR AS INDICATED;  Surgeon: Lawrence Serrano MD;  Location: [facility];  Service: Orthopedics   • SHOULDER ARTHROSCOPY WITH SUBACROMIAL DECOMPRESSION Left 2/20/2019    Procedure: POSSIBLE CAPSULAR PLICATION  ANTERIORLY, POSTERIOR  PROCEDURE AS INDICATED           (PAC BUS);  Surgeon: Salvador Hutchison MD;  Location: [facility];  Service: Orthopedics   • SPLENECTOMY     • TONSILLECTOMY AND ADENOIDECTOMY Bilateral 10/19/1993    Bilateral myringotomies with Durivent tube implants. T&A. Chronic recurrent bilateral secretory otitis media. Marked enlargement of the tonsils & adenoids causing infection       Prior to Admission medications    Not on File       Allergies   Allergen Reactions   • Augmentin [Amoxicillin-Pot Clavulanate] Other (See Comments)     \"dont know\"   • Erythromycin Ethylsuccinate Other (See Comments)     \"dont know\"   • Other Other (See Comments)     PEDIAZOLE   • Tramadol Hives   • Sulfa Antibiotics Rash   • Zanamivir Rash       Family History   Problem Relation Age of Onset   • Cancer Other    • Diabetes Other    • Heart disease Other    • Hypertension Other    • Ulcers Other    • Other Other         Bleeding tendency   • No Known Problems Mother    • No Known Problems Father    • No Known Problems Sister    • No Known Problems Sister    • No Known Problems Sister    • No Known Problems Sister        Social History     Socioeconomic History   • Marital status: Single     Spouse name: Not on file   • Number of children: Not on file   • Years of education: Not on file   • Highest education level: Not on file   Tobacco Use   • Smoking status: Never Smoker   • Smokeless tobacco: Never Used   Substance and Sexual Activity   • Alcohol use: Yes     Comment: socially   • Drug use: " "No   • Sexual activity: Defer       Review of Systems   Constitutional:        Night sweats   Gastrointestinal: Negative for abdominal pain, constipation, diarrhea, nausea and vomiting.       Objective     /76   Pulse 82   Temp 98 °F (36.7 °C) (Oral)   Ht 175.3 cm (69\")   Wt 70.8 kg (156 lb)   BMI 23.04 kg/m²     Physical Exam  Constitutional:       General: He is not in acute distress.     Appearance: Normal appearance. He is not ill-appearing, toxic-appearing or diaphoretic.   HENT:      Head: Normocephalic and atraumatic.   Eyes:      General: No scleral icterus.        Right eye: No discharge.         Left eye: No discharge.      Extraocular Movements: Extraocular movements intact.      Conjunctiva/sclera: Conjunctivae normal.   Cardiovascular:      Rate and Rhythm: Normal rate.   Pulmonary:      Effort: Pulmonary effort is normal. No respiratory distress.   Abdominal:      Palpations: Abdomen is soft.   Lymphadenopathy:      Lower Body: Right inguinal adenopathy present. Left inguinal adenopathy present.   Skin:     General: Skin is warm.   Neurological:      General: No focal deficit present.      Mental Status: He is alert and oriented to person, place, and time.   Psychiatric:         Mood and Affect: Mood normal.         Behavior: Behavior normal.         Thought Content: Thought content normal.         Judgment: Judgment normal.         DIAGNOSTIC DATA:    CT imaging reviewed showing enlarged lymph nodes in the hiral hepatis, periaortic region, iliac chain, inguinal region bilaterally    ASSESSMENT:    Diffuse lymphadenopathy    PLAN:    The patient has fairly easily palpable nodes in the left inguinal region.  These appear to be the most easily accessible.  I recommended that he undergo excisional biopsy of at least 1 of these lymph nodes for further diagnosis.  He is agreeable to this.  The risks and benefits of excision of left groin/inguinal lymph node have been discussed and he is scheduled " for surgery on 4/14/2021.          This document has been electronically signed by Salvador Lock MD on April 9, 2021 14:49 CDT

## 2021-04-09 NOTE — PROGRESS NOTES
CHIEF COMPLAINT:    Lymphadenopathy    HISTORY OF PRESENT ILLNESS:    Swapnil Kyle is a 30 y.o. 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  He is referred today by Dr. Rowe who recently saw the patient for diffuse lymphadenopathy.  This was demonstrated on physical exam as well as CT imaging.  Patient notes these palpable lymph nodes have persisted despite steroid and antibiotic therapy.  They have been present for several weeks.  He also notes intermittent night sweats several times per week.  He notes mild left groin discomfort due to the enlarged nodes in this region.    Past Medical History:   Diagnosis Date   • Acute pharyngitis    • Allergic rhinitis    • Allergic rhinitis due to pollen    • Anemia    • Aphthous ulcer of mouth    • Backache    • Common cold    • Cough    • Disorder of skin    • Dysuria    • Exanthematous disorder     numular eczema    • History of ITP    • History of regular medication use     long term    • History of respiratory therapy 03/07/2011    Nebulizer Treatment 82920 (1) - REJI Arredondo   • Hyperuricemia    • Increased frequency of urination    • Malaise and fatigue    • PONV (postoperative nausea and vomiting)    • Short stature disorder    • Thrombocytopenic purpura (CMS/HCC)        Past Surgical History:   Procedure Laterality Date   • MYRINGOTOMY Bilateral 06/23/1992    TUBE IMPLANT GENERAL ANESTHETIC 21616 (1) - Bilateral myringotomies w/ Fernández ventilating tube implants. examination of adenoids   • NOSE SURGERY  02/22/2002    Removal of the nasal packing under general anesthetic & inspection of nasal cavity. Patient has nasal packing that needs to be removed   • OTHER SURGICAL HISTORY  03/07/2011    Biopsy, Each Additional Lesion 11732 (1) - REJI Arredondo   • SHOULDER ARTHROSCOPY W/ LABRAL REPAIR Left 2/20/2019    Procedure: SHOULDER ARTHROSCOPY with revision anterior labral repair, SLAP repair, and subacromial decompression;  Surgeon:  "Salvador Hutchison MD;  Location: [facility];  Service: Orthopedics   • SHOULDER ARTHROSCOPY W/ SUPERIOR LABRAL ANTERIOR POSTERIOR REPAIR Left 8/30/2018    Procedure: DIAGNOSTIC ARTHROSCOPY LEFT SHOULDER WITH LABRAL REPAIR AS INDICATED;  Surgeon: Lawrence Serrano MD;  Location: [facility];  Service: Orthopedics   • SHOULDER ARTHROSCOPY WITH SUBACROMIAL DECOMPRESSION Left 2/20/2019    Procedure: POSSIBLE CAPSULAR PLICATION  ANTERIORLY, POSTERIOR  PROCEDURE AS INDICATED           (PAC BUS);  Surgeon: Salvador Hutchison MD;  Location: [facility];  Service: Orthopedics   • SPLENECTOMY     • TONSILLECTOMY AND ADENOIDECTOMY Bilateral 10/19/1993    Bilateral myringotomies with Durivent tube implants. T&A. Chronic recurrent bilateral secretory otitis media. Marked enlargement of the tonsils & adenoids causing infection       Prior to Admission medications    Not on File       Allergies   Allergen Reactions   • Augmentin [Amoxicillin-Pot Clavulanate] Other (See Comments)     \"dont know\"   • Erythromycin Ethylsuccinate Other (See Comments)     \"dont know\"   • Other Other (See Comments)     PEDIAZOLE   • Tramadol Hives   • Sulfa Antibiotics Rash   • Zanamivir Rash       Family History   Problem Relation Age of Onset   • Cancer Other    • Diabetes Other    • Heart disease Other    • Hypertension Other    • Ulcers Other    • Other Other         Bleeding tendency   • No Known Problems Mother    • No Known Problems Father    • No Known Problems Sister    • No Known Problems Sister    • No Known Problems Sister    • No Known Problems Sister        Social History     Socioeconomic History   • Marital status: Single     Spouse name: Not on file   • Number of children: Not on file   • Years of education: Not on file   • Highest education level: Not on file   Tobacco Use   • Smoking status: Never Smoker   • Smokeless tobacco: Never Used   Substance and Sexual Activity   • Alcohol use: Yes     Comment: socially   • Drug use: " "No   • Sexual activity: Defer       Review of Systems   Constitutional:        Night sweats   Gastrointestinal: Negative for abdominal pain, constipation, diarrhea, nausea and vomiting.       Objective     /76   Pulse 82   Temp 98 °F (36.7 °C) (Oral)   Ht 175.3 cm (69\")   Wt 70.8 kg (156 lb)   BMI 23.04 kg/m²     Physical Exam  Constitutional:       General: He is not in acute distress.     Appearance: Normal appearance. He is not ill-appearing, toxic-appearing or diaphoretic.   HENT:      Head: Normocephalic and atraumatic.   Eyes:      General: No scleral icterus.        Right eye: No discharge.         Left eye: No discharge.      Extraocular Movements: Extraocular movements intact.      Conjunctiva/sclera: Conjunctivae normal.   Cardiovascular:      Rate and Rhythm: Normal rate.   Pulmonary:      Effort: Pulmonary effort is normal. No respiratory distress.   Abdominal:      Palpations: Abdomen is soft.   Lymphadenopathy:      Lower Body: Right inguinal adenopathy present. Left inguinal adenopathy present.   Skin:     General: Skin is warm.   Neurological:      General: No focal deficit present.      Mental Status: He is alert and oriented to person, place, and time.   Psychiatric:         Mood and Affect: Mood normal.         Behavior: Behavior normal.         Thought Content: Thought content normal.         Judgment: Judgment normal.         DIAGNOSTIC DATA:    CT imaging reviewed showing enlarged lymph nodes in the hiral hepatis, periaortic region, iliac chain, inguinal region bilaterally    ASSESSMENT:    Diffuse lymphadenopathy    PLAN:    The patient has fairly easily palpable nodes in the left inguinal region.  These appear to be the most easily accessible.  I recommended that he undergo excisional biopsy of at least 1 of these lymph nodes for further diagnosis.  He is agreeable to this.  The risks and benefits of excision of left groin/inguinal lymph node have been discussed and he is scheduled " for surgery on 4/14/2021.          This document has been electronically signed by Salvador Lock MD on April 9, 2021 14:49 CDT

## 2021-04-11 ENCOUNTER — LAB (OUTPATIENT)
Dept: LAB | Facility: HOSPITAL | Age: 31
End: 2021-04-11

## 2021-04-11 DIAGNOSIS — Z01.818 PREOP TESTING: Primary | ICD-10-CM

## 2021-04-11 LAB — SARS-COV-2 N GENE RESP QL NAA+PROBE: NOT DETECTED

## 2021-04-11 PROCEDURE — 87635 SARS-COV-2 COVID-19 AMP PRB: CPT

## 2021-04-11 PROCEDURE — C9803 HOPD COVID-19 SPEC COLLECT: HCPCS

## 2021-04-14 ENCOUNTER — ANESTHESIA EVENT (OUTPATIENT)
Dept: PERIOP | Facility: HOSPITAL | Age: 31
End: 2021-04-14

## 2021-04-14 ENCOUNTER — HOSPITAL ENCOUNTER (OUTPATIENT)
Facility: HOSPITAL | Age: 31
Setting detail: HOSPITAL OUTPATIENT SURGERY
Discharge: HOME | End: 2021-04-14
Attending: SURGERY | Admitting: SURGERY

## 2021-04-14 ENCOUNTER — ANESTHESIA (OUTPATIENT)
Dept: PERIOP | Facility: HOSPITAL | Age: 31
End: 2021-04-14

## 2021-04-14 VITALS
BODY MASS INDEX: 22.73 KG/M2 | HEIGHT: 69 IN | WEIGHT: 153.44 LBS | RESPIRATION RATE: 18 BRPM | OXYGEN SATURATION: 99 % | TEMPERATURE: 97.8 F | SYSTOLIC BLOOD PRESSURE: 122 MMHG | DIASTOLIC BLOOD PRESSURE: 63 MMHG | HEART RATE: 108 BPM

## 2021-04-14 DIAGNOSIS — R59.1 LYMPHADENOPATHY: ICD-10-CM

## 2021-04-14 PROCEDURE — 25010000002 KETOROLAC TROMETHAMINE PER 15 MG: Performed by: NURSE ANESTHETIST, CERTIFIED REGISTERED

## 2021-04-14 PROCEDURE — 25010000002 FENTANYL CITRATE (PF) 100 MCG/2ML SOLUTION: Performed by: NURSE ANESTHETIST, CERTIFIED REGISTERED

## 2021-04-14 PROCEDURE — 25010000002 MIDAZOLAM PER 1 MG: Performed by: NURSE ANESTHETIST, CERTIFIED REGISTERED

## 2021-04-14 PROCEDURE — 25010000002 DEXAMETHASONE PER 1 MG: Performed by: NURSE ANESTHETIST, CERTIFIED REGISTERED

## 2021-04-14 PROCEDURE — 38531 OPEN BX/EXC INGUINOFEM NODES: CPT | Performed by: SURGERY

## 2021-04-14 PROCEDURE — 25010000002 HYDROMORPHONE 1 MG/ML SOLUTION: Performed by: NURSE ANESTHETIST, CERTIFIED REGISTERED

## 2021-04-14 PROCEDURE — 25010000002 PROPOFOL 10 MG/ML EMULSION: Performed by: NURSE ANESTHETIST, CERTIFIED REGISTERED

## 2021-04-14 PROCEDURE — 25010000002 ONDANSETRON PER 1 MG: Performed by: NURSE ANESTHETIST, CERTIFIED REGISTERED

## 2021-04-14 RX ORDER — HYDROCODONE BITARTRATE AND ACETAMINOPHEN 5; 325 MG/1; MG/1
1 TABLET ORAL EVERY 6 HOURS PRN
Qty: 12 TABLET | Refills: 0 | Status: SHIPPED | OUTPATIENT
Start: 2021-04-14 | End: 2021-04-22 | Stop reason: SDUPTHER

## 2021-04-14 RX ORDER — ONDANSETRON 2 MG/ML
4 INJECTION INTRAMUSCULAR; INTRAVENOUS ONCE AS NEEDED
Status: DISCONTINUED | OUTPATIENT
Start: 2021-04-14 | End: 2021-04-14 | Stop reason: HOSPADM

## 2021-04-14 RX ORDER — MEPERIDINE HYDROCHLORIDE 25 MG/ML
12.5 INJECTION INTRAMUSCULAR; INTRAVENOUS; SUBCUTANEOUS
Status: DISCONTINUED | OUTPATIENT
Start: 2021-04-14 | End: 2021-04-14 | Stop reason: HOSPADM

## 2021-04-14 RX ORDER — MIDAZOLAM HYDROCHLORIDE 1 MG/ML
INJECTION INTRAMUSCULAR; INTRAVENOUS AS NEEDED
Status: DISCONTINUED | OUTPATIENT
Start: 2021-04-14 | End: 2021-04-14 | Stop reason: SURG

## 2021-04-14 RX ORDER — BUPIVACAINE HYDROCHLORIDE 5 MG/ML
INJECTION, SOLUTION EPIDURAL; INTRACAUDAL AS NEEDED
Status: DISCONTINUED | OUTPATIENT
Start: 2021-04-14 | End: 2021-04-14 | Stop reason: HOSPADM

## 2021-04-14 RX ORDER — ONDANSETRON 2 MG/ML
INJECTION INTRAMUSCULAR; INTRAVENOUS AS NEEDED
Status: DISCONTINUED | OUTPATIENT
Start: 2021-04-14 | End: 2021-04-14 | Stop reason: SURG

## 2021-04-14 RX ORDER — LIDOCAINE HYDROCHLORIDE 20 MG/ML
INJECTION, SOLUTION EPIDURAL; INFILTRATION; INTRACAUDAL; PERINEURAL AS NEEDED
Status: DISCONTINUED | OUTPATIENT
Start: 2021-04-14 | End: 2021-04-14 | Stop reason: SURG

## 2021-04-14 RX ORDER — FENTANYL CITRATE 50 UG/ML
INJECTION, SOLUTION INTRAMUSCULAR; INTRAVENOUS AS NEEDED
Status: DISCONTINUED | OUTPATIENT
Start: 2021-04-14 | End: 2021-04-14 | Stop reason: SURG

## 2021-04-14 RX ORDER — PROPOFOL 10 MG/ML
VIAL (ML) INTRAVENOUS AS NEEDED
Status: DISCONTINUED | OUTPATIENT
Start: 2021-04-14 | End: 2021-04-14 | Stop reason: SURG

## 2021-04-14 RX ORDER — SODIUM CHLORIDE 9 MG/ML
100 INJECTION, SOLUTION INTRAVENOUS CONTINUOUS
Status: DISCONTINUED | OUTPATIENT
Start: 2021-04-14 | End: 2021-04-14 | Stop reason: HOSPADM

## 2021-04-14 RX ORDER — DEXAMETHASONE SODIUM PHOSPHATE 4 MG/ML
INJECTION, SOLUTION INTRA-ARTICULAR; INTRALESIONAL; INTRAMUSCULAR; INTRAVENOUS; SOFT TISSUE AS NEEDED
Status: DISCONTINUED | OUTPATIENT
Start: 2021-04-14 | End: 2021-04-14 | Stop reason: SURG

## 2021-04-14 RX ORDER — SCOLOPAMINE TRANSDERMAL SYSTEM 1 MG/1
1 PATCH, EXTENDED RELEASE TRANSDERMAL CONTINUOUS
Status: DISCONTINUED | OUTPATIENT
Start: 2021-04-14 | End: 2021-04-14 | Stop reason: HOSPADM

## 2021-04-14 RX ORDER — KETOROLAC TROMETHAMINE 30 MG/ML
INJECTION, SOLUTION INTRAMUSCULAR; INTRAVENOUS AS NEEDED
Status: DISCONTINUED | OUTPATIENT
Start: 2021-04-14 | End: 2021-04-14 | Stop reason: SURG

## 2021-04-14 RX ORDER — PROMETHAZINE HYDROCHLORIDE 25 MG/1
25 SUPPOSITORY RECTAL ONCE AS NEEDED
Status: DISCONTINUED | OUTPATIENT
Start: 2021-04-14 | End: 2021-04-14 | Stop reason: HOSPADM

## 2021-04-14 RX ORDER — PROMETHAZINE HYDROCHLORIDE 25 MG/1
25 TABLET ORAL ONCE AS NEEDED
Status: DISCONTINUED | OUTPATIENT
Start: 2021-04-14 | End: 2021-04-14 | Stop reason: HOSPADM

## 2021-04-14 RX ADMIN — FENTANYL CITRATE 50 MCG: 50 INJECTION INTRAMUSCULAR; INTRAVENOUS at 11:11

## 2021-04-14 RX ADMIN — AZTREONAM 2 G: 2 INJECTION, POWDER, FOR SOLUTION INTRAMUSCULAR; INTRAVENOUS at 11:01

## 2021-04-14 RX ADMIN — KETOROLAC TROMETHAMINE 30 MG: 30 INJECTION, SOLUTION INTRAMUSCULAR; INTRAVENOUS at 11:22

## 2021-04-14 RX ADMIN — MIDAZOLAM HYDROCHLORIDE 2 MG: 2 INJECTION, SOLUTION INTRAMUSCULAR; INTRAVENOUS at 10:57

## 2021-04-14 RX ADMIN — SCOPALAMINE 1 PATCH: 1 PATCH, EXTENDED RELEASE TRANSDERMAL at 08:25

## 2021-04-14 RX ADMIN — PROPOFOL 200 MG: 10 INJECTION, EMULSION INTRAVENOUS at 10:57

## 2021-04-14 RX ADMIN — ONDANSETRON 4 MG: 2 INJECTION INTRAMUSCULAR; INTRAVENOUS at 11:22

## 2021-04-14 RX ADMIN — HYDROMORPHONE HYDROCHLORIDE 0.5 MG: 1 INJECTION, SOLUTION INTRAMUSCULAR; INTRAVENOUS; SUBCUTANEOUS at 11:57

## 2021-04-14 RX ADMIN — LIDOCAINE HYDROCHLORIDE 100 MG: 20 INJECTION, SOLUTION EPIDURAL; INFILTRATION; INTRACAUDAL; PERINEURAL at 10:57

## 2021-04-14 RX ADMIN — DEXAMETHASONE SODIUM PHOSPHATE 4 MG: 4 INJECTION, SOLUTION INTRAMUSCULAR; INTRAVENOUS at 11:05

## 2021-04-14 RX ADMIN — HYDROMORPHONE HYDROCHLORIDE 0.5 MG: 1 INJECTION, SOLUTION INTRAMUSCULAR; INTRAVENOUS; SUBCUTANEOUS at 11:44

## 2021-04-14 RX ADMIN — SODIUM CHLORIDE 100 ML/HR: 9 INJECTION, SOLUTION INTRAVENOUS at 08:25

## 2021-04-14 NOTE — ANESTHESIA PREPROCEDURE EVALUATION
Anesthesia Evaluation     Patient summary reviewed and Nursing notes reviewed   history of anesthetic complications (Scopalamine patch ordered pre-op.): PONV  NPO Solid Status: > 8 hours  NPO Liquid Status: > 8 hours           Airway   Mallampati: I  TM distance: >3 FB  Neck ROM: full  No difficulty expected  Comment: General anesthesia 2/20/19. LMA #4. 8/30/18. MAC #3, ETT 8.0, Grade 1.  Dental - normal exam     Pulmonary - negative pulmonary ROS and normal exam    breath sounds clear to auscultation  (-) COPD, asthma, sleep apnea, not a smoker  Cardiovascular - negative cardio ROS and normal exam  Exercise tolerance: excellent (>7 METS)    ECG reviewed  Rhythm: regular  Rate: normal    (-) hypertension, valvular problems/murmurs, dysrhythmias, angina, murmur, cardiac stents, PVD, DVT, hyperlipidemia    ROS comment: Sinus bradycardia with marked sinus arrhythmia  Otherwise normal ECG Early repolarization  No previous ECGs available  Confirmed by SUNNI    Neuro/Psych- negative ROS  (-) seizures, TIA, CVA, headaches, psychiatric history  GI/Hepatic/Renal/Endo - negative ROS   (-) GERD, liver disease, no renal disease, diabetes    Musculoskeletal         ROS comment: Left shoulder  Abdominal    Substance History - negative use  (-) drug useAlcohol use: rarely.     OB/GYN negative ob/gyn ROS         Other   arthritis (Shoulder surgery.),      (-) history of cancer                    Anesthesia Plan    ASA 2     general     intravenous induction     Anesthetic plan, all risks, benefits, and alternatives have been provided, discussed and informed consent has been obtained with: patient.

## 2021-04-14 NOTE — OP NOTE
Operative Note    Swapnil Kyle  4/14/2021    Pre-op Diagnosis:   Lymphadenopathy [R59.1]    Post-op Diagnosis:     Post-Op Diagnosis Codes:     * Lymphadenopathy [R59.1]    Procedure/CPT® Codes:      Procedure(s):  Excision of 3 left groin lymph nodes    Surgeon(s):  Salvador Lock MD    Anesthesia: General    Staff:   Circulator: Shanta Garcia RN  Scrub Person: Kg Riley  Assistant: Christal Davey CSA    Estimated Blood Loss: minimal    Specimens:                ID Type Source Tests Collected by Time   A (Not marked as sent) : Left groin lymph node Tissue Lymph Node TISSUE PATHOLOGY EXAM Salvador Lock MD 4/14/2021 1111         Drains: * No LDAs found *    Findings: Diffuse inguinal adenopathy    Complications: None    Indication: Unexplained diffuse lymphadenopathy    Operative Note:    The patient was seen, marked, and consented preoperatively.  Following this he was brought to the operating room and placed in supine position on the OR table.  General anesthetic was administered and an airway was placed and secured by anesthesia.  Preoperative briefing was performed.  The left groin was prepped and draped in normal sterile fashion.  A timeout was then performed.    Multiple palpable nodes were identified in the left groin.  A skin incision was marked overlying several of the lymph nodes.  Local anesthetic was then injected and a skin incision was made using a scalpel.  Dissection through the subcutaneous tissues and occurred using electrocautery.  Metzenbaum scissors were then used to dissect the most superficial palpable node away from the underlying tissues.  The node was then grasped and elevated upward.  It appeared to be greater than 1 cm in diameter.  It was dissected away from the surrounding tissue using electrocautery and passed off the field as specimen.  2 additional adjacent nodes were also removed and passed off the field as specimen.  A small point of  bleeding was controlled using a 3-0 Vicryl suture.  Additional local anesthetic was then injected.  The skin incision was then closed in layers using 2-0, 3-0, and 4-0 Vicryl suture.  Skin affix was placed over the incision.  The patient was then awakened and returned to recovery.    Assistant: Christal Davey CSA was responsible for performing the following activities: Retraction, Suction, Irrigation and Placing Dressing and their skilled assistance was necessary for the success of this case.          This document has been electronically signed by Salvador Lock MD on April 14, 2021 13:47 CDT      Salvador Lock MD     Date: 4/14/2021  Time: 13:45 CDT

## 2021-04-14 NOTE — INTERVAL H&P NOTE
H&P reviewed. The patient was examined and there are no changes to the H&P.          This document has been electronically signed by Salvador Lock MD on April 14, 2021 10:33 CDT

## 2021-04-14 NOTE — ANESTHESIA POSTPROCEDURE EVALUATION
Patient: Swapnil Flores Stone    Procedure Summary     Date: 04/14/21 Room / Location: [facility] OR 03 /  [facility]    Anesthesia Start: 1051 Anesthesia Stop: 1135    Procedure: Excision of left groin lymph node (Left ) Diagnosis:       Lymphadenopathy      (Lymphadenopathy [R59.1])    Surgeons: Salvador Lock MD Provider: Robert Love MD    Anesthesia Type: general ASA Status: 2          Anesthesia Type: general    Vitals  No vitals data found for the desired time range.          Post Anesthesia Care and Evaluation    Patient location during evaluation: bedside  Patient participation: complete - patient cannot participate  Level of consciousness: awake  Pain score: 0  Pain management: adequate  Airway patency: patent  Anesthetic complications: No anesthetic complications  PONV Status: none  Cardiovascular status: acceptable  Respiratory status: acceptable  Hydration status: acceptable

## 2021-04-14 NOTE — BRIEF OP NOTE
BIOPSY LYMPH NODE  Progress Note    Swapnil Kyle  4/14/2021    Pre-op Diagnosis:   Lymphadenopathy [R59.1]       Post-Op Diagnosis Codes:     * Lymphadenopathy [R59.1]    Procedure/CPT® Codes:        Procedure(s):  Excision of left groin lymph node    Surgeon(s):  Salvador Lock MD    Anesthesia: General    Staff:   Circulator: Shanta Garcia RN  Scrub Person: Kg Riley  Assistant: Christal Davey CSA  Assistant: Christal Davey CSA      Estimated Blood Loss: minimal    Urine Voided: * No values recorded between 4/14/2021 10:50 AM and 4/14/2021 11:20 AM *    Specimens:                Specimens     ID Source Type Tests Collected By Collected At Frozen?    A Lymph Node Tissue · TISSUE PATHOLOGY EXAM   Salvador Lock MD 4/14/21 1111     Description: Left groin lymph node    This specimen was not marked as sent.                Drains: * No LDAs found *    Findings: adenopathy    Complications: none    Assistant: Christal Davey CSA  was responsible for performing the following activities: Retraction, Suction, Irrigation and Placing Dressing and their skilled assistance was necessary for the success of this case.            This document has been electronically signed by aSlvador Lock MD on April 14, 2021 11:32 CDT      Salvador Lock MD     Date: 4/14/2021  Time: 11:32 CDT

## 2021-04-14 NOTE — ANESTHESIA PROCEDURE NOTES
Airway  Urgency: elective    Date/Time: 4/14/2021 10:58 AM    General Information and Staff    Patient location during procedure: OR  CRNA: Malvin Lacey CRNA    Indications and Patient Condition  Indications for airway management: airway protection    Preoxygenated: yes  Mask difficulty assessment: 0 - not attempted    Final Airway Details  Final airway type: supraglottic airway      Successful airway: I-gel  Size 4    Number of attempts at approach: 1  Assessment: lips, teeth, and gum same as pre-op

## 2021-04-15 ENCOUNTER — TELEPHONE (OUTPATIENT)
Dept: SURGERY | Facility: CLINIC | Age: 31
End: 2021-04-15

## 2021-04-19 ENCOUNTER — TELEPHONE (OUTPATIENT)
Dept: ORTHOPEDIC SURGERY | Facility: CLINIC | Age: 31
End: 2021-04-19

## 2021-04-19 ENCOUNTER — TELEPHONE (OUTPATIENT)
Dept: ONCOLOGY | Facility: CLINIC | Age: 31
End: 2021-04-19

## 2021-04-19 DIAGNOSIS — G89.29 CHRONIC PAIN OF LEFT KNEE: Primary | ICD-10-CM

## 2021-04-19 DIAGNOSIS — M25.562 CHRONIC PAIN OF LEFT KNEE: Primary | ICD-10-CM

## 2021-04-19 DIAGNOSIS — S83.002A PATELLAR SUBLUXATION, LEFT, INITIAL ENCOUNTER: ICD-10-CM

## 2021-04-19 NOTE — TELEPHONE ENCOUNTER
Pt CALLED WANTING TO KNOW IF YOU COULD ORDER MRI OF HIS LT KNEE AND HE FOLLOW UP ONCE HE HAS IT DONE     PLEASE ADVISE   Pt CALL BACK # [phone]

## 2021-04-22 ENCOUNTER — OFFICE VISIT (OUTPATIENT)
Dept: SURGERY | Facility: CLINIC | Age: 31
End: 2021-04-22

## 2021-04-22 ENCOUNTER — APPOINTMENT (OUTPATIENT)
Dept: ONCOLOGY | Facility: CLINIC | Age: 31
End: 2021-04-22

## 2021-04-22 VITALS
BODY MASS INDEX: 22.66 KG/M2 | TEMPERATURE: 97 F | WEIGHT: 153 LBS | HEART RATE: 62 BPM | SYSTOLIC BLOOD PRESSURE: 122 MMHG | DIASTOLIC BLOOD PRESSURE: 66 MMHG | HEIGHT: 69 IN

## 2021-04-22 DIAGNOSIS — R59.1 LYMPHADENOPATHY: ICD-10-CM

## 2021-04-22 DIAGNOSIS — Z09 FOLLOW UP: Primary | ICD-10-CM

## 2021-04-22 PROCEDURE — 99024 POSTOP FOLLOW-UP VISIT: CPT | Performed by: SURGERY

## 2021-04-22 RX ORDER — HYDROCODONE BITARTRATE AND ACETAMINOPHEN 5; 325 MG/1; MG/1
1 TABLET ORAL EVERY 6 HOURS PRN
Qty: 12 TABLET | Refills: 0 | Status: SHIPPED | OUTPATIENT
Start: 2021-04-22 | End: 2021-04-29 | Stop reason: SDUPTHER

## 2021-04-22 NOTE — PROGRESS NOTES
CHIEF COMPLAINT:    Chief Complaint   Patient presents with   • Post-op Follow-up     Post operative Excision of Left groin lymph node 4-14-21       HISTORY OF PRESENT ILLNESS:    Swapnil Kyle is a 30 y.o. male who underwent excisional biopsy of left groin lymph node for persistent and diffuse adenopathy on 4/14/2021.  He returns today for follow-up.  On permanent section pathology reactive lymphadenopathy with mild architectural abnormality was seen, the specimen was negative for granulomas, metastatic cancer, lymphoproliferative disorders.  These results were discussed with the patient today.    He complains of fairly sharp pain in his groin with intermittent swelling in the area particularly in the morning.  He notes no swelling in his leg.    EXAM:  Vitals:    04/22/21 1409   BP: 122/66   Pulse: 62   Temp: 97 °F (36.1 °C)         Minimal swelling at site of left groin incision    ASSESSMENT:    Status post left groin excisional lymph node biopsy    PLAN:    Pathology at this point is fairly indeterminate as for a cause of his diffuse adenopathy.  I recommended that he continue to follow-up with hematology/oncology for further work-up if needed.  He is scheduled to see them next week.  I will also see him in about a week to reassess the area for swelling.  His pain medication was refilled today to provide a few more days of pain relief.          This document has been electronically signed by Salvador Lock MD on April 22, 2021 14:40 CDT

## 2021-04-23 ENCOUNTER — TELEPHONE (OUTPATIENT)
Dept: ONCOLOGY | Facility: CLINIC | Age: 31
End: 2021-04-23

## 2021-04-27 ENCOUNTER — TELEPHONE (OUTPATIENT)
Dept: ONCOLOGY | Facility: CLINIC | Age: 31
End: 2021-04-27

## 2021-04-27 NOTE — TELEPHONE ENCOUNTER
Oncology SW advised by Mary Wynne, RN Medical oncology of pt need for transportaton to specialty apt in [place]. In summation, pt with [place] medicaid and found to have mass that needs biopsy.  Cannot be accomplished at this location and referral indicated for orthopedic oncology service.  Pt is eligible for [facility] transport with written need.  [facility] faxed paperwork that was signed by APRN in Dr. Luciano's absence, per [facility] APRN is approved.    SW called Mr. Kyle to advise and to

## 2021-04-28 ENCOUNTER — TELEPHONE (OUTPATIENT)
Dept: SURGERY | Facility: CLINIC | Age: 31
End: 2021-04-28

## 2021-04-28 DIAGNOSIS — R59.1 LYMPHADENOPATHY: ICD-10-CM

## 2021-04-28 RX ORDER — HYDROCODONE BITARTRATE AND ACETAMINOPHEN 5; 325 MG/1; MG/1
1 TABLET ORAL EVERY 6 HOURS PRN
Qty: 12 TABLET | Refills: 0 | Status: CANCELLED | OUTPATIENT
Start: 2021-04-28

## 2021-04-29 ENCOUNTER — OFFICE VISIT (OUTPATIENT)
Dept: SURGERY | Facility: CLINIC | Age: 31
End: 2021-04-29

## 2021-04-29 VITALS
DIASTOLIC BLOOD PRESSURE: 84 MMHG | WEIGHT: 150 LBS | TEMPERATURE: 97.5 F | HEART RATE: 99 BPM | HEIGHT: 69 IN | SYSTOLIC BLOOD PRESSURE: 136 MMHG | BODY MASS INDEX: 22.22 KG/M2

## 2021-04-29 DIAGNOSIS — Z09 FOLLOW UP: Primary | ICD-10-CM

## 2021-04-29 DIAGNOSIS — R59.1 LYMPHADENOPATHY: ICD-10-CM

## 2021-04-29 PROCEDURE — 99024 POSTOP FOLLOW-UP VISIT: CPT | Performed by: SURGERY

## 2021-04-29 RX ORDER — HYDROCODONE BITARTRATE AND ACETAMINOPHEN 5; 325 MG/1; MG/1
1 TABLET ORAL EVERY 6 HOURS PRN
Qty: 12 TABLET | Refills: 0 | Status: SHIPPED | OUTPATIENT
Start: 2021-04-29 | End: 2021-08-20

## 2021-04-29 NOTE — TELEPHONE ENCOUNTER
Pt. Returned call to advise [facility] has approved transportatoin . Pt. States appreciation for support.

## 2021-04-30 ENCOUNTER — HOSPITAL ENCOUNTER (OUTPATIENT)
Dept: MRI IMAGING | Facility: HOSPITAL | Age: 31
Discharge: HOME | End: 2021-04-30
Admitting: ORTHOPAEDIC SURGERY

## 2021-04-30 ENCOUNTER — APPOINTMENT (OUTPATIENT)
Dept: MRI IMAGING | Facility: HOSPITAL | Age: 31
End: 2021-04-30

## 2021-04-30 DIAGNOSIS — S83.002A PATELLAR SUBLUXATION, LEFT, INITIAL ENCOUNTER: ICD-10-CM

## 2021-04-30 DIAGNOSIS — M25.562 CHRONIC PAIN OF LEFT KNEE: ICD-10-CM

## 2021-04-30 DIAGNOSIS — G89.29 CHRONIC PAIN OF LEFT KNEE: ICD-10-CM

## 2021-04-30 PROCEDURE — 73723 MRI JOINT LWR EXTR W/O&W/DYE: CPT

## 2021-04-30 PROCEDURE — 25010000002 GADOTERIDOL PER 1 ML: Performed by: ORTHOPAEDIC SURGERY

## 2021-04-30 PROCEDURE — A9579 GAD-BASE MR CONTRAST NOS,1ML: HCPCS | Performed by: ORTHOPAEDIC SURGERY

## 2021-04-30 RX ADMIN — GADOTERIDOL 15 ML: 279.3 INJECTION, SOLUTION INTRAVENOUS at 15:52

## 2021-05-04 ENCOUNTER — OFFICE VISIT (OUTPATIENT)
Dept: ORTHOPEDIC SURGERY | Facility: CLINIC | Age: 31
End: 2021-05-04

## 2021-05-04 VITALS — HEIGHT: 69 IN | WEIGHT: 150.7 LBS | BODY MASS INDEX: 22.32 KG/M2

## 2021-05-04 DIAGNOSIS — G89.29 CHRONIC PAIN OF LEFT KNEE: Primary | ICD-10-CM

## 2021-05-04 DIAGNOSIS — S83.002A PATELLAR SUBLUXATION, LEFT, INITIAL ENCOUNTER: ICD-10-CM

## 2021-05-04 DIAGNOSIS — M25.562 ACUTE PAIN OF LEFT KNEE: ICD-10-CM

## 2021-05-04 DIAGNOSIS — M25.562 CHRONIC PAIN OF LEFT KNEE: Primary | ICD-10-CM

## 2021-05-04 DIAGNOSIS — R59.1 LYMPHADENOPATHY: ICD-10-CM

## 2021-05-04 PROCEDURE — 99213 OFFICE O/P EST LOW 20 MIN: CPT | Performed by: ORTHOPAEDIC SURGERY

## 2021-05-04 RX ORDER — DICLOFENAC SODIUM 75 MG/1
75 TABLET, DELAYED RELEASE ORAL 2 TIMES DAILY
Qty: 60 TABLET | Refills: 3 | Status: SHIPPED | OUTPATIENT
Start: 2021-05-04 | End: 2021-08-20

## 2021-05-04 NOTE — PROGRESS NOTES
"Swapnil Kyle is a 30 y.o. male returns for     Chief Complaint   Patient presents with   • Results     MRI Left knee       HISTORY OF PRESENT ILLNESS: Patient is here today for MRI results of left knee. He states that his pain is 8/10.  Had biopsy with dr Lock about 4 weeks ago.  All reports have been negative with no signs of cancer  Patient is still complaining of pain with activity and reports that it just hurts all the time.  He has been in therapy and done exercises and can't return to meaningful activity.    Initial injury was about 1 year ago.      He reports that his visit with the orthopedic oncologist was confusing.  He reports that the orthopedic oncologist told him that he was not sure why he was there and that there was not anything he could do.  He did not feel that the lesions in the posterior aspect of his knee were of any concern.  Patient reports pain all the time in his knee.  He is also seen general surgery for a lymph node biopsy which was reportedly negative.  He states that his knee hurts and he wants it fixed.     CONCURRENT MEDICAL HISTORY:    The following portions of the patient's history were reviewed and updated as appropriate: allergies, current medications, past family history, past medical history, past social history, past surgical history and problem list.     ROS  No fevers or chills.  No chest pain or shortness of air.  No GI or  disturbances.    PHYSICAL EXAMINATION:       Ht 175.3 cm (69\")   Wt 68.4 kg (150 lb 11.2 oz)   BMI 22.25 kg/m²     Physical Exam  Constitutional:       General: He is not in acute distress.     Appearance: Normal appearance.   Pulmonary:      Effort: Pulmonary effort is normal. No respiratory distress.   Neurological:      Mental Status: He is alert and oriented to person, place, and time.         GAIT:     []  Normal  [x]  Antalgic    Assistive device: []  None  []  Walker     [x]  Crutches  []  Cane     []  Wheelchair  []  " Stretcher    Left Knee Exam     Muscle Strength   The patient has normal left knee strength.    Tenderness   Left knee tenderness location: Mild diffuse tenderness.    Range of Motion   The patient has normal left knee ROM.    Tests   Varus: negative Valgus: negative  Lachman:  Anterior - negative      Drawer:  Anterior - negative         Other   Erythema: absent  Sensation: normal  Pulse: present  Swelling: none              MRI Knee Left With & Without Contrast    Result Date: 5/1/2021  Narrative: MRI of the left knee without and with contrast HISTORY: Left knee pain. Chronic pain. Unspecified subluxation of left patella. Instability issues. Soft tissue mass. Multisequence multiplanar images of the left knee were obtained without and with contrast. COMPARISON: None. Correlation with radiographs of January 22, 2021. FINDINGS: Partial tear tibial aspect anterior cruciate ligament. Small joint effusion. Edema in Hoffa's fat pad. Edema in the deep fat ventral to the medial aspect of the distal femoral diametaphyseal region. Within this edema there appears to be a 2.1 cm x 1.47 cm x 0.8 cm nonspecific enhancing nodule just anterior or dorsal to the distal superficial femoral artery. Minimal lateral subluxation of the patella. The posterior cruciate ligament, collateral ligaments, quadriceps tendon and patellar tendon are intact. No meniscal tear. No bone bruise.     Impression: CONCLUSION: Partial tear tibial aspect anterior cruciate ligament. Small joint effusion. Edema in Hoffa's fat pad. Edema in the deep fat ventral to the medial aspect of the distal femoral diametaphyseal region. Within this edema there appears to be a 2.1 cm x 1.47 cm x 0.8 cm nonspecific enhancing nodule just anterior or dorsal to the distal superficial femoral artery. Recommend comparison with any prior studies. Minimal lateral subluxation of the patella. [id number] Electronically signed by:  Des Wynne MD  5/1/2021 5:21 PM CDT Workstation:  [id number]            ASSESSMENT:    Diagnoses and all orders for this visit:    Chronic pain of left knee  -     Ambulatory Referral to Physical Therapy Evaluate and treat    Acute pain of left knee  -     Ambulatory Referral to Physical Therapy Evaluate and treat    Patellar subluxation, left, initial encounter  -     Ambulatory Referral to Physical Therapy Evaluate and treat    Lymphadenopathy    Other orders  -     diclofenac (VOLTAREN) 75 MG EC tablet; Take 1 tablet by mouth 2 (Two) Times a Day.          PLAN    Will get a KT-1000 to assess for instability.  Patient is convinced that he needs ACL reconstruction.  Will get objective findings with the KT - 1000 to direct further treatment options.    However, there seems to be a significant disconnect in the transfer of information.  In review of the orthopedic oncologists note, Dr. Hough mentions that he too is significantly concerned with Mr. Kyle's history and symptoms.  The 30 pound weight loss over a short period of time without the attempt of weight loss is significantly concerning.  According to his note, Dr. Hough recommends proceeding with a PET scan and then biopsying and available lymph node that is PET positive.  This is significantly different than Mr. Kyle's portrayal of the visit.    I am not convinced that ACL reconstruction would help to solve any of his problems at this point.    He really needs to have a PCP to help to manage his medical care.    Return in about 2 weeks (around 5/18/2021) for recheck.    Salvador Hutchison MD

## 2021-05-05 ENCOUNTER — TELEPHONE (OUTPATIENT)
Dept: ORTHOPEDIC SURGERY | Facility: CLINIC | Age: 31
End: 2021-05-05

## 2021-05-07 ENCOUNTER — TELEPHONE (OUTPATIENT)
Dept: ONCOLOGY | Facility: CLINIC | Age: 31
End: 2021-05-07

## 2021-05-09 NOTE — PROGRESS NOTES
CHIEF COMPLAINT:    Chief Complaint   Patient presents with   • Follow-up     Excision of Left Groin lymph node 4-14-21       HISTORY OF PRESENT ILLNESS:    Swapnil Kyle is a 30 y.o. male who underwent prior excision of lymph nodes from the left groin on 4/14/2021.  He returns today for additional follow-up with complaints of pain and occasional swelling in the area without redness or drainage.  His pathology was previously discussed with him.  He notes that he has worse swelling with certain activities.    EXAM:  Vitals:    04/29/21 1402   BP: 136/84   Pulse: 99   Temp: 97.5 °F (36.4 °C)         Well-healing left groin incision without any appreciable swelling or hernias in the region, no erythema.    ASSESSMENT:    Status post excision of left groin lymph nodes    PLAN:    Continue follow-up with oncology.  See me back in 2 weeks to recheck the site.  Call or return sooner if persistent swelling develops.          This document has been electronically signed by Salvador Lock MD on May 9, 2021 15:58 CDT

## 2021-05-10 ENCOUNTER — TELEPHONE (OUTPATIENT)
Dept: ONCOLOGY | Facility: CLINIC | Age: 31
End: 2021-05-10

## 2021-05-10 ENCOUNTER — HOSPITAL ENCOUNTER (OUTPATIENT)
Dept: PHYSICAL THERAPY | Facility: HOSPITAL | Age: 31
Setting detail: THERAPIES SERIES
Discharge: HOME | End: 2021-05-10

## 2021-05-10 ENCOUNTER — APPOINTMENT (OUTPATIENT)
Dept: ONCOLOGY | Facility: CLINIC | Age: 31
End: 2021-05-10

## 2021-05-10 ENCOUNTER — APPOINTMENT (OUTPATIENT)
Dept: PHYSICAL THERAPY | Facility: HOSPITAL | Age: 31
End: 2021-05-10

## 2021-05-10 DIAGNOSIS — M25.562 ACUTE PAIN OF LEFT KNEE: ICD-10-CM

## 2021-05-10 DIAGNOSIS — G89.29 CHRONIC PAIN OF LEFT KNEE: Primary | ICD-10-CM

## 2021-05-10 DIAGNOSIS — M25.562 CHRONIC PAIN OF LEFT KNEE: Primary | ICD-10-CM

## 2021-05-10 DIAGNOSIS — S83.002A SUBLUXATION OF LEFT PATELLA, INITIAL ENCOUNTER: ICD-10-CM

## 2021-05-10 PROCEDURE — 97162 PT EVAL MOD COMPLEX 30 MIN: CPT

## 2021-05-10 NOTE — THERAPY EVALUATION
Outpatient Physical Therapy Ortho Initial Evaluation  [facility]     Patient Name: Swapnil Kyle  : 1990  MRN: [id number]  Today's Date: 5/10/2021      Visit Date: 05/10/2021   Attendance:  ( approved)  Subjective Improvement: n/a  Next MD Visit: 2021  Recert Date: 2021    Therapy Diagnosis: Left knee pain, patellar subluxation       Patient Active Problem List   Diagnosis   • Left shoulder pain   • Labral tear of shoulder, left, initial encounter   • Ankle instability, right   • Chronic pain of left knee   • Lymphadenopathy   • History of ITP   • H/O splenectomy        Past Medical History:   Diagnosis Date   • Acute pharyngitis    • Allergic rhinitis    • Allergic rhinitis due to pollen    • Anemia    • Aphthous ulcer of mouth    • Backache    • Common cold    • Cough    • Disorder of skin    • Dysuria    • Exanthematous disorder     numular eczema    • History of ITP    • History of regular medication use     long term    • History of respiratory therapy 2011    Nebulizer Treatment 40754 (1) - REJI Arredondo   • Hyperuricemia    • Increased frequency of urination    • Malaise and fatigue    • PONV (postoperative nausea and vomiting)    • Short stature disorder    • Thrombocytopenic purpura (CMS/HCC)         Past Surgical History:   Procedure Laterality Date   • LYMPH NODE BIOPSY Left 2021    Procedure: Excision of left groin lymph node;  Surgeon: Salvador Lock MD;  Location: [facility];  Service: General;  Laterality: Left;   • MYRINGOTOMY Bilateral 1992    TUBE IMPLANT GENERAL ANESTHETIC 12904 (1) - Bilateral myringotomies w/ Fernández ventilating tube implants. examination of adenoids   • NOSE SURGERY  2002    Removal of the nasal packing under general anesthetic & inspection of nasal cavity. Patient has nasal packing that needs to be removed   • OTHER SURGICAL HISTORY  2011    Biopsy, Each Additional Lesion 93980 (1) - REJI Arredondo   •  "SHOULDER ARTHROSCOPY W/ LABRAL REPAIR Left 2/20/2019    Procedure: SHOULDER ARTHROSCOPY with revision anterior labral repair, SLAP repair, and subacromial decompression;  Surgeon: Salvador Hutchison MD;  Location: [facility];  Service: Orthopedics   • SHOULDER ARTHROSCOPY W/ SUPERIOR LABRAL ANTERIOR POSTERIOR REPAIR Left 8/30/2018    Procedure: DIAGNOSTIC ARTHROSCOPY LEFT SHOULDER WITH LABRAL REPAIR AS INDICATED;  Surgeon: Lawrence Serrano MD;  Location: [facility];  Service: Orthopedics   • SHOULDER ARTHROSCOPY WITH SUBACROMIAL DECOMPRESSION Left 2/20/2019    Procedure: POSSIBLE CAPSULAR PLICATION  ANTERIORLY, POSTERIOR  PROCEDURE AS INDICATED           (PAC BUS);  Surgeon: Salvador Hutchison MD;  Location: [facility];  Service: Orthopedics   • SPLENECTOMY     • TONSILLECTOMY AND ADENOIDECTOMY Bilateral 10/19/1993    Bilateral myringotomies with Durivent tube implants. T&A. Chronic recurrent bilateral secretory otitis media. Marked enlargement of the tonsils & adenoids causing infection       Current Outpatient Medications   Medication Instructions   • diclofenac (VOLTAREN) 75 mg, Oral, 2 Times Daily   • HYDROcodone-acetaminophen (NORCO) 5-325 MG per tablet Take 1 tablet by mouth Every 6 (Six) Hours As Needed for Moderate Pain or Severe Pain.     Allergies   Allergen Reactions   • Augmentin [Amoxicillin-Pot Clavulanate] Other (See Comments)     \"dont know\"   • Erythromycin Ethylsuccinate Other (See Comments)     \"dont know\"   • Other Other (See Comments)     PEDIAZOLE   • Tramadol Hives   • Codeine Rash   • Sulfa Antibiotics Rash   • Zanamivir Rash         Visit Dx:     ICD-10-CM ICD-9-CM   1. Chronic pain of left knee  M25.562 719.46    G89.29 338.29   2. Acute pain of left knee  M25.562 719.46   3. Subluxation of left patella, initial encounter  S83.002A 836.3         Patient History     Row Name 05/10/21 1600             History    Chief Complaint  Difficulty Walking;Difficulty with daily " "activities;Pain;Swelling;Tightness  -      Type of Pain  Knee pain Left  -      Date Current Problem(s) Began  09/07/20  -      Brief Description of Current Complaint  Pt stated that on 9/7/2020 he was doing agility exercises, he was cutting when he heard and felt a pop. He stated he stumbled and fell right when it happened. He stated he waited a few days and then decided to see his PCP. He had imaging with no significant findings. He was sent to physical therapy (completed 4-5 months total and stopped going as he was not improvement. Repeat imaging performed with contrast which showed ACL involvement. Orthopedics and an Orthopedic Oncologist have been following this pt.  Dr. Hutchison referred pt specifically for KT 1000 test. Single male living alone, he does visit his grandparents \"from time to time\" and will stay for a few days. No stairs in either home.    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  MRI  -      Results of Clinical Tests  MRI on 4/30/21: Partial tear tibial aspect anterior cruciate ligament. Small joint effusion. Edema in Hoffa's fat pad. Edema in the deep fat ventral to the medial aspect of the distal femoral diametaphyseal region. Within this edema there appears to be a 2.1 cm x 1.47 cm x 0.8 cm nonspecific enhancing nodule just anterior or dorsal to the distal superficial femoral artery. Recommend comparison with any prior studies. Minimal lateral subluxation of the patella.  -         Pain     Pain Location  Knee left  -      Pain at Present  6  -MH      Pain at Best  6  -MH      Pain at Worst  9  -      Pain Frequency  Constant/continuous  -      " "Pain Description  Sharp  -      What Performance Factors Make the Current Problem(s) WORSE?  Standing, walking, stairs, exercising.  -      What Performance Factors Make the Current Problem(s) BETTER?  Rest  -      Tolerance Time- Standing  30 min  -      Tolerance Time- Walking  \"Depends on the speed.\" Slow pace: 30 min  -      Is your sleep disturbed?  No  -MH      Is medication used to assist with sleep?  No  -      Difficulties at work?  n/a  -      Difficulties with ADL's?  \"Its always painful.\" Pt stated he can complete all ADLs  -      Difficulties with recreational activities?  unable or limited  -         Fall Risk Assessment    Any falls in the past year:  Yes  -         Services    Are you currently receiving Home Health services  No  -      Do you plan to receive Home Health services in the near future  No  -         Daily Activities    Primary Language  English  -         Safety    Are you being hurt, hit, or frightened by anyone at home or in your life?  No  -MH      Are you being neglected by a caregiver  No  -      Have you had any of the following issues with  N/A  -        User Key  (r) = Recorded By, (t) = Taken By, (c) = Cosigned By    Initials Name Provider Type     Kavita Srivastava, PT Physical Therapist          PT Ortho     Row Name 05/10/21 1600       Subjective Comments    Subjective Comments  See therapy pt history.  -       Subjective Pain    Able to rate subjective pain?  yes  -    Pre-Treatment Pain Level  6  -    Post-Treatment Pain Level  6  -       Posture/Observations    Posture- WNL  Posture is WNL  -    Posture/Observations Comments  TTP popliteal kaykay and patella grossly. No edema. No ecchymosis. Good skin integrity.  -       Knee Special Tests    Anterior drawer (ACL lesion)  Negative  -    Lachman’s (ACL lesion)  Negative  -    Posterior drawer (PCL lesion)  Negative  -    Knee Special Tests Comments  See KT 1000 sheet.  -       " General ROM    RT Lower Ext  Comment  -MH    LT Lower Ext  Comment  -MH       Right Lower Ext    RT Lower Extremity Comments  WFL  -MH       Left Lower Ext    Lt Knee Extension/Flexion AROM  0-120 deg; painful  -       MMT (Manual Muscle Testing)    Rt Lower Ext  Comments  -MH    Lt Lower Ext  Comments;Lt Hip Flexion;Lt Hip Internal (Medial) Rotation;Lt Hip External (Lateral) Rotation;Lt Knee Extension;Lt Knee Flexion;Lt Ankle Dorsiflexion;Lt Hip Extension;Lt Hip ABduction;Lt Hip ADduction  -       MMT Right Lower Ext    Rt Lower Extremity Comments   5/5 grossly  -MH       MMT Left Lower Ext    Lt Hip Flexion MMT, Gross Movement  (4/5) good  -MH    Lt Hip Extension MMT, Gross Movement  (4-/5) good minus  -MH    Lt Hip ABduction MMT, Gross Movement  (4/5) good  -MH    Lt Hip ADduction MMT, Gross Movement  (4/5) good  -MH    Lt Hip Internal (Medial) Rotation MMT, Gross Movement  (4/5) good  -MH    Lt Hip External (Lateral) Rotation MMT, Gross Movement  (4/5) good  -MH    Lt Knee Extension MMT, Gross Movement  (4/5) good  -MH    Lt Knee Flexion MMT, Gross Movement  (4/5) good  -MH    Lt Ankle Dorsiflexion MMT, Gross Movement  (5/5) normal  -    Lt Lower Extremity Comments   Pain  -       Sensation    Sensation WNL?  WNL  -    Light Touch  No apparent deficits  -       Flexibility    Flexibility Tested?  Lower Extremity  -       Lower Extremity Flexibility    Hamstrings  Left:;Mildly limited  -    Gastrocnemius  Left:;Mildly limited  -    Soleus  Left:;WNL  -MH       Gait/Stairs (Locomotion)    [place] Level (Gait)  independent  -      User Key  (r) = Recorded By, (t) = Taken By, (c) = Cosigned By    Initials Name Provider Type    Kavita Simmons PT Physical Therapist                      Therapy Education  Education Details: Findings of evaluation and plan for physical therapy.  How Provided: Verbal  Provided to: Patient  Level of Understanding: Verbalized     PT OP Goals     Row Name  05/10/21 1600          PT Short Term Goals    STG Date to Achieve  06/07/21  -     STG 1  Pt will be independent with HEP for self-management of symptoms.  -     STG 1 Progress  New  -     STG 2  Pt's left LE MMT timothy improve to 5/5 grossly as a measure of improved strength.  -     STG 2 Progress  New  -     STG 3  Pt's left hamstring flexibility will improve to 0 deg when measured at 90/90.   -     STG 3 Progress  New  -     STG 4  Pt will be able to participate in agility activities without increased knee pain.  -     STG 4 Progress  New  -        Time Calculation    PT Goal Re-Cert Due Date  05/31/21  -       User Key  (r) = Recorded By, (t) = Taken By, (c) = Cosigned By    Initials Name Provider Type     Kavita Srivastava, PT Physical Therapist          PT Assessment/Plan     Row Name 05/10/21 1600          PT Assessment    Functional Limitations  Impaired locomotion;Limitations in community activities;Performance in sport activities  -     Impairments  Endurance;Impaired flexibility;Impaired muscle length;Impaired muscle power;Joint integrity;Muscle strength;Peripheral nerve integrity;Range of motion  -     Assessment Comments  Mr. Kyle is a 29yo male who presents to physical therapy with chronic left knee pain. He is currently having pain and difficulty with all activities that involve weight bearing (walking, standing, stairs, exercises). He demonstrates pain, TTP, decreased ROM, decreased flexibility, and decreased strength.  Pt is being seen by an orthopedic oncologists for concerns of possible CA as pt has had significant unintentional weight loss in a short time frame. Pt has completed ~5 months of skilled PT at another clinic with little to no improvements. Pt stated that he does not believe more physical therapy will help him any. I agree with this pt. I believe that more skilled PT may not help at this time. I educated the pt that he will be placed on hold for 30 days if he or  his MD decide to do another trial of skilled PT. If pt does not call to be placed on the schedule in 30 days, he will be considered d/c at that time.   -     Please refer to paper survey for additional self-reported information  Yes  -MH     Rehab Potential  Fair  -     Patient/caregiver participated in establishment of treatment plan and goals  Yes  -     Patient would benefit from skilled therapy intervention  Yes  -        PT Plan    PT Frequency  -- TBD  -     Predicted Duration of Therapy Intervention (PT)  TBD  -     Planned CPT's?  PT EVAL MOD COMPLELITY: 72539;PT RE-EVAL: 88696;PT THER PROC EA 15 MIN: 70359;PT THER ACT EA 15 MIN: 39761;PT MANUAL THERAPY EA 15 MIN: 37569;PT NEUROMUSC RE-EDUCATION EA 15 MIN: 04157;PT GAIT TRAINING EA 15 MIN: 33110;PT SELF CARE/HOME MGMT/TRAIN EA 15: 49921;PT ELECTRICAL STIM UNATTEND: ;PT ELECTRICAL STIM ATTD EA 15 MIN: 62143;PT ULTRASOUND EA 15 MIN: 86383;PT HOT/COLD PACK WC NONMCARE: 18499;PT THER SUPP EA 15 MIN  -     PT Plan Comments  If pt or MD decide to trial another round of skilled PT: LE strength, balance, stretching, ROM, sport related activities, agility, modalities and manual as needed.  -       User Key  (r) = Recorded By, (t) = Taken By, (c) = Cosigned By    Initials Name Provider Type    Kavita Simmons, PT Physical Therapist            OP Exercises     Row Name 05/10/21 1600             Subjective Comments    Subjective Comments  See therapy pt history.  -         Subjective Pain    Able to rate subjective pain?  yes  -      Pre-Treatment Pain Level  6  -      Post-Treatment Pain Level  6  -        User Key  (r) = Recorded By, (t) = Taken By, (c) = Cosigned By    Initials Name Provider Type    Kavita Simmons PT Physical Therapist                        Outcome Measure Options: Lower Extremity Functional Scale (LEFS)         Time Calculation:     Start Time: 1601  Stop Time: 1645  Time Calculation (min): 44 min  Time  Calculation- PT  Start Time: 1601  Stop Time: 1645  Time Calculation (min): 44 min  PT Received On: 05/10/21  PT Goal Re-Cert Due Date: 05/31/21  Untimed Charges  PT Eval/Re-eval Minutes: 45  Total Minutes  Untimed Charges Total Minutes: 45   Total Minutes: 45     Therapy Charges for Today     Code Description Service Date Service Provider Modifiers Qty    [id number] HC PT EVAL MOD COMPLEXITY 3 5/10/2021 Kavita Srivastava, PT GP 1          PT G-Codes  Outcome Measure Options: Lower Extremity Functional Scale (LEFS)         Kavita Srivastava, PT  5/10/2021

## 2021-05-10 NOTE — TELEPHONE ENCOUNTER
Spoke with Dr. Hough's nurse in [place] to see what was going on with the status of this.   Per Radha, PET scan was denied by patient's insurance but they are scheduling a [id number] at the end of this week.   They stated that once they know the status post [id number] they will call the patient with what they need to do next.     I informed the patient of this information - v/u obtained.

## 2021-05-11 ENCOUNTER — APPOINTMENT (OUTPATIENT)
Dept: ONCOLOGY | Facility: CLINIC | Age: 31
End: 2021-05-11

## 2021-05-11 DIAGNOSIS — R59.1 LYMPHADENOPATHY: Primary | ICD-10-CM

## 2021-05-18 ENCOUNTER — TELEPHONE (OUTPATIENT)
Dept: ONCOLOGY | Facility: CLINIC | Age: 31
End: 2021-05-18

## 2021-05-18 NOTE — TELEPHONE ENCOUNTER
Pt contacted undersigned on 5-17-21 regarding need for transportation referral to [facility] as he has been referred to Pain Management> pt. Explained in detail his apt is on 5-26-21 at [address] [facility] [facility] [place], [facility] [place] at 11:20.  The apt was made for [place] as it would be several weeks before he could be seen in [place].  Pt. states the plan is to follow up after that visit in [place].  Subsequent to call, ELAINE contacted Bettina at [facility] to seek [employer]. She will be faxing information for physician to sign that verifies need of trip, out of state transport.  SW will assist and contact pt when completed.

## 2021-06-07 ENCOUNTER — APPOINTMENT (OUTPATIENT)
Dept: ONCOLOGY | Facility: CLINIC | Age: 31
End: 2021-06-07

## 2021-06-07 LAB
LAB AP CASE REPORT: NORMAL
PATH REPORT.ADDENDUM SPEC: NORMAL
PATH REPORT.FINAL DX SPEC: NORMAL

## 2021-06-17 ENCOUNTER — APPOINTMENT (OUTPATIENT)
Dept: ONCOLOGY | Facility: CLINIC | Age: 31
End: 2021-06-17

## 2021-08-20 ENCOUNTER — TELEMEDICINE (OUTPATIENT)
Dept: FAMILY MEDICINE CLINIC | Facility: TELEHEALTH | Age: 31
End: 2021-08-20

## 2021-08-20 DIAGNOSIS — U07.1 UPPER RESPIRATORY TRACT INFECTION DUE TO COVID-19 VIRUS: Primary | ICD-10-CM

## 2021-08-20 DIAGNOSIS — R50.9 LOW GRADE FEVER: ICD-10-CM

## 2021-08-20 DIAGNOSIS — J06.9 UPPER RESPIRATORY TRACT INFECTION DUE TO COVID-19 VIRUS: Primary | ICD-10-CM

## 2021-08-20 PROBLEM — M25.371 ANKLE INSTABILITY, RIGHT: Status: RESOLVED | Noted: 2019-07-05 | Resolved: 2021-08-20

## 2021-08-20 PROBLEM — D69.6 THROMBOCYTOPENIA (HCC): Status: ACTIVE | Noted: 2019-06-04

## 2021-08-20 PROCEDURE — 99203 OFFICE O/P NEW LOW 30 MIN: CPT | Performed by: NURSE PRACTITIONER

## 2021-08-20 RX ORDER — MELOXICAM 15 MG/1
TABLET ORAL
COMMUNITY
Start: 2021-06-02 | End: 2022-09-22

## 2021-08-20 NOTE — PATIENT INSTRUCTIONS
Treat symptoms as you would with any cold or viral illness.   Alternate tylenol and motrin for pain and/or fever, stay hydrated and rest.   If symptoms worsen or do not improve follow up with your PCP or visit your nearest Urgent Care Center or ER.      How to Quarantine at Home  Information for Patients and Families    These instructions are for people with confirmed or suspected COVID-19 who do not need to be hospitalized and those with confirmed COVID-19 who were hospitalized and discharged to care for themselves at home.    If you were tested through the Health Department  The Health Department will monitor your wellbeing.  If it is determined that you do not need to be hospitalized and can be isolated at home, you will be monitored by staff from your local or state health department.     If you were tested through a Commercial [facility]  You will need to monitor yourself and report changes in your symptoms to your doctor.  See the section below called Monitor Your Symptoms.    Follow these steps until a healthcare provider or local or state health department says you can return to your normal activities.    Stay home except to get medical care  • Restrict activities outside your home, except for getting medical care.   • Do not go to work, school, or public areas.   • Avoid using public transportation, ride-sharing, or taxis.    Separate yourself from other people and animals in your home  People  As much as possible, you should stay in a specific room and away from other people in your home. Also, you should use a separate bathroom, if available.    Animals  You should restrict contact with pets and other animals while you are sick with COVID-19, just like you would around other people. When possible, have another member of your household care for your animals while you are sick. If you are sick with COVID-19, avoid contact with your pet, including petting, snuggling, being kissed or licked, and sharing food. If you  must care for your pet or be around animals while you are sick, wash your hands before and after you interact with pets and wear a facemask. See COVID-19 and Animals for more information.    Call ahead before visiting your doctor  If you have a medical appointment, call the healthcare provider and tell them that you have or may have COVID-19. This information will help the healthcare provider’s office take steps to keep other people from getting infected or exposed.    Wear a facemask  You should wear a facemask when you are around other people (e.g., sharing a room or vehicle) or pets and before you enter a healthcare provider’s office.     If you are not able to wear a facemask (for example, because it causes trouble breathing), then people who live with you should not stay in the same room with you, or they should wear a facemask if they enter your room.    Cover your coughs and sneezes  • Cover your mouth and nose with a tissue when you cough or sneeze.   • Throw used tissues in a lined trash can.   • Immediately wash your hands with soap and water for at least 20 seconds or, if soap and water are not available, clean your hands with an alcohol-based hand  that contains at least 60% alcohol.    Clean your hands often  • Wash your hands often with soap and water for at least 20 seconds, especially after blowing your nose, coughing, or sneezing; going to the bathroom; and before eating or preparing food.     • If soap and water are not readily available, use an alcohol-based hand  with at least 60% alcohol, covering all surfaces of your hands and rubbing them together until they feel dry.    • Soap and water are the best option if hands are visibly dirty. Avoid touching your eyes, nose, and mouth with unwashed hands.    Avoid sharing personal household items  • You should not share dishes, drinking glasses, cups, eating utensils, towels, or bedding with other people or pets in your home.    • After using these items, they should be washed thoroughly with soap and water.    Clean all “high-touch” surfaces everyday  • High touch surfaces include counters, tabletops, doorknobs, bathroom fixtures, toilets, phones, keyboards, tablets, and bedside tables.   • Also, clean any surfaces that may have blood, stool, or body fluids on them.   • Use a household cleaning spray or wipe, according to the label instructions. Labels contain instructions for safe and effective use of the cleaning product, including precautions you should take when applying the product, such as wearing gloves and making sure you have good ventilation during use of the product.    Monitor your symptoms  • Seek prompt medical attention if your illness is worsening (e.g., difficulty breathing).   • Before seeking care, call your healthcare provider and tell them that you have, or are being evaluated for, COVID-19.   • Put on a facemask before you enter the facility.     • These steps will help the healthcare provider’s office to keep other people in the office or waiting room from getting infected or exposed.   • Persons who are placed under active monitoring or facilitated self-monitoring should follow instructions provided by their local health department or occupational health professionals, as appropriate.  • If you have a medical emergency and need to call 911, notify the dispatch personnel that you have, or are being evaluated for COVID-19. If possible, put on a facemask before emergency medical services arrive.    Discontinuing home isolation  Patients with confirmed COVID-19 should remain under home isolation precautions until the risk of secondary transmission to others is thought to be low. The decision to discontinue home isolation precautions should be made on a case-by-case basis, in consultation with healthcare providers and state and local health departments.    The below content are for household members, intimate partners,  and caregivers of a patient with symptomatic laboratory-confirmed COVID-19 or a patient under investigation:    Household members, intimate partners, and caregivers may have close contact with a person with symptomatic, laboratory-confirmed COVID-19 or a person under investigation.     Close contacts should monitor their health; they should call their healthcare provider right away if they develop symptoms suggestive of COVID-19 (e.g., fever, cough, shortness of breath)     Close contacts should also follow these recommendations:  • Make sure that you understand and can help the patient follow their healthcare provider’s instructions for medication(s) and care. You should help the patient with basic needs in the home and provide support for getting groceries, prescriptions, and other personal needs.  • Monitor the patient’s symptoms. If the patient is getting sicker, call his or her healthcare provider and tell them that the patient has laboratory-confirmed COVID-19. This will help the healthcare provider’s office take steps to keep other people in the office or waiting room from getting infected. Ask the healthcare provider to call the local or [facility] health department for additional guidance. If the patient has a medical emergency and you need to call 911, notify the dispatch personnel that the patient has, or is being evaluated for COVID-19.  • Household members should stay in another room or be  from the patient as much as possible. Household members should use a separate bedroom and bathroom, if available.  • Prohibit visitors who do not have an essential need to be in the home.  • Household members should care for any pets in the home. Do not handle pets or other animals while sick.  For more information, see COVID-19 and Animals.  • Make sure that shared spaces in the home have good air flow, such as by an air conditioner or an opened window, weather permitting.  • Perform hand hygiene frequently.  Wash your hands often with soap and water for at least 20 seconds or use an alcohol-based hand  that contains 60 to 95% alcohol, covering all surfaces of your hands and rubbing them together until they feel dry. Soap and water should be used preferentially if hands are visibly dirty.  • Avoid touching your eyes, nose, and mouth with unwashed hands.  • The patient should wear a facemask when you are around other people. If the patient is not able to wear a facemask (for example, because it causes trouble breathing), you, as the caregiver, should wear a mask when you are in the same room as the patient.  • Wear a disposable facemask and gloves when you touch or have contact with the patient’s blood, stool, or body fluids, such as saliva, sputum, nasal mucus, vomit, or urine.   o Throw out disposable facemasks and gloves after using them. Do not reuse.  o When removing personal protective equipment, first remove and dispose of gloves. Then, immediately clean your hands with soap and water or alcohol-based hand . Next, remove and dispose of facemask, and immediately clean your hands again with soap and water or alcohol-based hand .  • Avoid sharing household items with the patient. You should not share dishes, drinking glasses, cups, eating utensils, towels, bedding, or other items. After the patient uses these items, you should wash them thoroughly (see below “Wash laundry thoroughly”).  • Clean all “high-touch” surfaces, such as counters, tabletops, doorknobs, bathroom fixtures, toilets, phones, keyboards, tablets, and bedside tables, every day. Also, clean any surfaces that may have blood, stool, or body fluids on them.   o Use a household cleaning spray or wipe, according to the label instructions. Labels contain instructions for safe and effective use of the cleaning product including precautions you should take when applying the product, such as wearing gloves and making sure you have  good ventilation during use of the product.  • Wash laundry thoroughly.   o Immediately remove and wash clothes or bedding that have blood, stool, or body fluids on them.  o Wear disposable gloves while handling soiled items and keep soiled items away from your body. Clean your hands (with soap and water or an alcohol-based hand ) immediately after removing your gloves.  o Read and follow directions on labels of laundry or clothing items and detergent. In general, using a normal laundry detergent according to washing machine instructions and dry thoroughly using the warmest temperatures recommended on the clothing label.  • Place all used disposable gloves, facemasks, and other contaminated items in a lined container before disposing of them with other household waste. Clean your hands (with soap and water or an alcohol-based hand ) immediately after handling these items. Soap and water should be used preferentially if hands are visibly dirty.  • Discuss any additional questions with your state or local health department or healthcare provider.    Adapted from information provided by the Centers for Disease Control and Prevention.  For more information, visit https://www.cdc.gov/coronavirus/2019-ncov/hcp/guidance-prevent-spread.htmlViral Respiratory Infection  A respiratory infection is an illness that affects part of the respiratory system, such as the lungs, nose, or throat. A respiratory infection that is caused by a virus is called a viral respiratory infection.  Common types of viral respiratory infections include:  · A cold.  · The flu (influenza).  · A respiratory syncytial virus (RSV) infection.  What are the causes?  This condition is caused by a virus.  What are the signs or symptoms?  Symptoms of this condition include:  · A stuffy or runny nose.  · Yellow or green nasal discharge.  · A cough.  · Sneezing.  · Fatigue.  · Achy muscles.  · A sore throat.  · Sweating or chills.  · A  fever.  · A headache.  How is this diagnosed?  This condition may be diagnosed based on:  · Your symptoms.  · A physical exam.  · Testing of nasal swabs.  How is this treated?  This condition may be treated with medicines, such as:  · Antiviral medicine. This may shorten the length of time a person has symptoms.  · Expectorants. These make it easier to cough up mucus.  · Decongestant nasal sprays.  · Acetaminophen or NSAIDs to relieve fever and pain.  Antibiotic medicines are not prescribed for viral infections. This is because antibiotics are designed to kill bacteria. They are not effective against viruses.  Follow these instructions at home:    Managing pain and congestion  · Take over-the-counter and prescription medicines only as told by your health care provider.  · If you have a sore throat, gargle with a salt-water mixture 3-4 times a day or as needed. To make a salt-water mixture, completely dissolve ½-1 tsp of salt in 1 cup of warm water.  · Use nose drops made from salt water to ease congestion and soften raw skin around your nose.  · Drink enough fluid to keep your urine pale yellow. This helps prevent dehydration and helps loosen up mucus.  General instructions  · Rest as much as possible.  · Do not drink alcohol.  · Do not use any products that contain nicotine or tobacco, such as cigarettes and e-cigarettes. If you need help quitting, ask your health care provider.  · Keep all follow-up visits as told by your health care provider. This is important.  How is this prevented?    · Get an annual flu shot. You may get the flu shot in late summer, fall, or winter. Ask your health care provider when you should get your flu shot.  · Avoid exposing others to your respiratory infection.  ? Stay home from work or school as told by your health care provider.  ? Wash your hands with soap and water often, especially after you cough or sneeze. If soap and water are not available, use alcohol-based hand  .  · Avoid contact with people who are sick during cold and flu season. This is generally fall and winter.  Contact a health care provider if:  · Your symptoms last for 10 days or longer.  · Your symptoms get worse over time.  · You have a fever.  · You have severe sinus pain in your face or forehead.  · The glands in your jaw or neck become very swollen.  Get help right away if you:  · Feel pain or pressure in your chest.  · Have shortness of breath.  · Faint or feel like you will faint.  · Have severe and persistent vomiting.  · Feel confused or disoriented.  Summary  · A respiratory infection is an illness that affects part of the respiratory system, such as the lungs, nose, or throat. A respiratory infection that is caused by a virus is called a viral respiratory infection.  · Common types of viral respiratory infections are a cold, influenza, and respiratory syncytial virus (RSV) infection.  · Symptoms of this condition include a stuffy or runny nose, cough, sneezing, fatigue, achy muscles, sore throat, and fevers or chills.  · Antibiotic medicines are not prescribed for viral infections. This is because antibiotics are designed to kill bacteria. They are not effective against viruses.  This information is not intended to replace advice given to you by your health care provider. Make sure you discuss any questions you have with your health care provider.  Document Revised: 12/26/2019 Document Reviewed: 01/28/2019  [employer] Patient Education © 2021 [employer]  10 Things You Can Do to Manage Your COVID-19 Symptoms at Home  If you have possible or confirmed COVID-19:  1. Stay home from work and school. And stay away from other public places. If you must go out, avoid using any kind of public transportation, ridesharing, or taxis.  2. Monitor your symptoms carefully. If your symptoms get worse, call your healthcare provider immediately.  3. Get rest and stay hydrated.  4. If you have a medical  appointment, call the healthcare provider ahead of time and tell them that you have or may have COVID-19.  5. For medical emergencies, call 911 and notify the dispatch personnel that you have or may have COVID-19.  6. Cover your cough and sneezes with a tissue or use the inside of your elbow.  7. Wash your hands often with soap and water for at least 20 seconds or clean your hands with an alcohol-based hand  that contains at least 60% alcohol.  8. As much as possible, stay in a specific room and away from other people in your home. Also, you should use a separate bathroom, if available. If you need to be around other people in or outside of the home, wear a mask.  9. Avoid sharing personal items with other people in your household, like dishes, towels, and bedding.  10. Clean all surfaces that are touched often, like counters, tabletops, and doorknobs. Use household cleaning sprays or wipes according to the label instructions.  cdc.gov/coronavirus  07/01/2020  This information is not intended to replace advice given to you by your health care provider. Make sure you discuss any questions you have with your health care provider.  Document Revised: 04/15/2021 Document Reviewed: 04/15/2021  [employer] Patient Education © 2021 [employer]

## 2021-08-20 NOTE — PROGRESS NOTES
"Subjective   Chief Complaint   Patient presents with   • Fever   • COVID positive       Swapnil Kyle is a 30 y.o. male.     Pt reports bodyaches, HA, Sore throat, chills, sweating x 6 days. Symptoms are improving. He took a home COVID test and it was positive. He is seeking advise because he continues to have a low grade fever 100.1-100.5 intermittently. He states he felt better a couple of days ago, then he woke up the follow day with a low grade fever again. Denies SOA, wheezing, purulent sputum, chest pain, sinus pain/pressure.    Fever   This is a new problem. Episode onset: 6 days. The problem has been waxing and waning. Maximum temperature: max 100.5. Associated symptoms include coughing (mild infrequent), muscle aches and a sore throat. Pertinent negatives include no abdominal pain, chest pain, congestion, diarrhea, ear pain, headaches, nausea, rash, sleepiness, urinary pain, vomiting or wheezing. Treatments tried: cough drops.   Risk factors: recent sickness         Allergies   Allergen Reactions   • Augmentin [Amoxicillin-Pot Clavulanate] Other (See Comments)     \"dont know\"   • Erythromycin Ethylsuccinate Other (See Comments)     \"dont know\"   • Other Other (See Comments)     PEDIAZOLE   • Tramadol Hives   • Codeine Rash   • Sulfa Antibiotics Rash   • Zanamivir Rash       Past Medical History:   Diagnosis Date   • Acute pharyngitis    • Allergic rhinitis    • Allergic rhinitis due to pollen    • Anemia    • Aphthous ulcer of mouth    • Backache    • Common cold    • Cough    • Disorder of skin    • Dysuria    • Exanthematous disorder     numular eczema    • History of ITP    • History of regular medication use     long term    • History of respiratory therapy 03/07/2011    Nebulizer Treatment 52670 (1) - REJI Arredondo   • Hyperuricemia    • Increased frequency of urination    • Malaise and fatigue    • PONV (postoperative nausea and vomiting)    • Short stature disorder    • Thrombocytopenic purpura " (CMS/[facility])        Past Surgical History:   Procedure Laterality Date   • LYMPH NODE BIOPSY Left 4/14/2021    Procedure: Excision of left groin lymph node;  Surgeon: Salvador Lock MD;  Location: [facility];  Service: General;  Laterality: Left;   • MYRINGOTOMY Bilateral 06/23/1992    TUBE IMPLANT GENERAL ANESTHETIC 98699 (1) - Bilateral myringotomies w/ Fernández ventilating tube implants. examination of adenoids   • NOSE SURGERY  02/22/2002    Removal of the nasal packing under general anesthetic & inspection of nasal cavity. Patient has nasal packing that needs to be removed   • OTHER SURGICAL HISTORY  03/07/2011    Biopsy, Each Additional Lesion 22132 (1) - REJI Arredondo   • SHOULDER ARTHROSCOPY W/ LABRAL REPAIR Left 2/20/2019    Procedure: SHOULDER ARTHROSCOPY with revision anterior labral repair, SLAP repair, and subacromial decompression;  Surgeon: Salvador Hutchison MD;  Location: [facility];  Service: Orthopedics   • SHOULDER ARTHROSCOPY W/ SUPERIOR LABRAL ANTERIOR POSTERIOR REPAIR Left 8/30/2018    Procedure: DIAGNOSTIC ARTHROSCOPY LEFT SHOULDER WITH LABRAL REPAIR AS INDICATED;  Surgeon: Lawrence Serrano MD;  Location: [facility];  Service: Orthopedics   • SHOULDER ARTHROSCOPY WITH SUBACROMIAL DECOMPRESSION Left 2/20/2019    Procedure: POSSIBLE CAPSULAR PLICATION  ANTERIORLY, POSTERIOR  PROCEDURE AS INDICATED           (PAC BUS);  Surgeon: Salvador Hutchison MD;  Location: [facility];  Service: Orthopedics   • SPLENECTOMY     • TONSILLECTOMY AND ADENOIDECTOMY Bilateral 10/19/1993    Bilateral myringotomies with Durivent tube implants. T&A. Chronic recurrent bilateral secretory otitis media. Marked enlargement of the tonsils & adenoids causing infection       Social History     Socioeconomic History   • Marital status: Single     Spouse name: Not on file   • Number of children: Not on file   • Years of education: Not on file   • Highest education level: Not on file   Tobacco Use   • Smoking  status: Never Smoker   • Smokeless tobacco: Never Used   Vaping Use   • Vaping Use: Never used   Substance and Sexual Activity   • Alcohol use: Yes     Comment: socially   • Drug use: No   • Sexual activity: Defer       Family History   Problem Relation Age of Onset   • Cancer Other    • Diabetes Other    • Heart disease Other    • Hypertension Other    • Ulcers Other    • Other Other         Bleeding tendency   • No Known Problems Mother    • No Known Problems Father    • No Known Problems Sister    • No Known Problems Sister    • No Known Problems Sister    • No Known Problems Sister          Current Outpatient Medications:   •  meloxicam (MOBIC) 15 MG tablet, , Disp: , Rfl:       Review of Systems   Constitutional: Positive for chills, diaphoresis, fatigue and fever.   HENT: Positive for sore throat. Negative for congestion and ear pain.    Respiratory: Positive for cough (mild infrequent). Negative for chest tightness, shortness of breath and wheezing.    Cardiovascular: Negative for chest pain and palpitations.   Gastrointestinal: Negative for abdominal pain, diarrhea, nausea and vomiting.   Genitourinary: Negative for dysuria.   Musculoskeletal: Positive for myalgias.   Skin: Negative for rash.   Neurological: Negative for headache (resolved).        There were no vitals filed for this visit.    Objective   Physical Exam  Constitutional:       General: He is not in acute distress.     Appearance: Normal appearance. He is not ill-appearing, toxic-appearing or diaphoretic.      Comments: Pt appears to be doing well   HENT:      Head: Normocephalic and atraumatic.      Nose: No congestion or rhinorrhea.      Right Sinus: No maxillary sinus tenderness or frontal sinus tenderness.      Left Sinus: No maxillary sinus tenderness or frontal sinus tenderness.      Comments: Per pt       Mouth/Throat:      Lips: Pink.      Mouth: Mucous membranes are moist.   Pulmonary:      Effort: Pulmonary effort is normal.    Neurological:      Mental Status: He is alert and oriented to person, place, and time.   Psychiatric:         Mood and Affect: Mood normal.         Speech: Speech normal.         Behavior: Behavior normal.          Procedures     Assessment/Plan   Diagnoses and all orders for this visit:    1. Upper respiratory tract infection due to COVID-19 virus (Primary)    2. Low grade fever    Treat symptoms as you would with any cold or viral illness.   Alternate tylenol and motrin for pain and/or fever, stay hydrated and rest.   If symptoms worsen or do not improve follow up with your PCP or visit your nearest Urgent Care Center or ER.          PLAN: Pt seeking advise and reassurance. Discussed when he should seek in person follow up (SOA, wheezing, chest tightness, purulent sputum, chest pain, high fevers over 101 etc). Discussed dosing, side effects, recommended other symptomatic care.  Patient should follow up with primary care provider if symptoms worsen, fail to resolve or other symptoms need attention. Patient/family agree to the above.     I spent 20 minutes caring for Swapnil on this date of service. This time includes time spent by me in the following activities:preparing for the visit, obtaining and/or reviewing a separately obtained history, performing a medically appropriate examination and/or evaluation , counseling and educating the patient/family/caregiver, ordering medications, tests, or procedures and documenting information in the medical record    DEONDRE De La Vega     This visit was performed via Telehealth.  This patient has been instructed to follow-up with their primary care provider if their symptoms worsen or the treatment provided does not resolve their illness.

## 2021-12-10 ENCOUNTER — TELEMEDICINE (OUTPATIENT)
Dept: FAMILY MEDICINE CLINIC | Facility: TELEHEALTH | Age: 31
End: 2021-12-10

## 2021-12-10 VITALS — TEMPERATURE: 98.7 F | WEIGHT: 150 LBS | HEIGHT: 69 IN | BODY MASS INDEX: 22.22 KG/M2

## 2021-12-10 DIAGNOSIS — J06.9 ACUTE URI: Primary | ICD-10-CM

## 2021-12-10 DIAGNOSIS — J32.4 PANSINUSITIS, UNSPECIFIED CHRONICITY: ICD-10-CM

## 2021-12-10 PROCEDURE — 99213 OFFICE O/P EST LOW 20 MIN: CPT | Performed by: NURSE PRACTITIONER

## 2021-12-10 RX ORDER — GUAIFENESIN 600 MG/1
600 TABLET, EXTENDED RELEASE ORAL 2 TIMES DAILY
Qty: 28 TABLET | Refills: 0 | Status: SHIPPED | OUTPATIENT
Start: 2021-12-10 | End: 2021-12-24

## 2021-12-10 RX ORDER — AZITHROMYCIN 250 MG/1
TABLET, FILM COATED ORAL
Qty: 6 TABLET | Refills: 0 | Status: SHIPPED | OUTPATIENT
Start: 2021-12-10 | End: 2022-09-22

## 2021-12-10 NOTE — PROGRESS NOTES
You have chosen to receive care through a telehealth visit.  Do you consent to use a video/audio connection for your medical care today? Yes     CHIEF COMPLAINT  Cc: cough, sinus problems    HPI  Swapnil Kyle is a 31 y.o. male  presents with complaint of cough and sinus problems. Symptoms include; yellow nasal congestion, post nasal drainage, runny nose, sinus pain and pressure, sore throat, cough and an intermittent headache. 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 He does not think that he has COVID-19. He did have COVID 08/21. He is also vaccinated via two doses of the Moderna vaccine. He reports that he has had his symptoms for 5 days now and has been using cough drops and flonase for his symptoms.    Review of Systems   Constitutional: Negative for fatigue and fever.   HENT: Positive for congestion (yellow), postnasal drip, rhinorrhea, sinus pressure, sinus pain and sore throat.    Respiratory: Positive for cough. Negative for chest tightness, shortness of breath and wheezing.    Cardiovascular: Negative for chest pain.   Gastrointestinal: Negative for diarrhea, nausea and vomiting.   Musculoskeletal: Negative for myalgias.   Neurological: Positive for headaches (at times).       Past Medical History:   Diagnosis Date   • Acute pharyngitis    • Allergic rhinitis    • Allergic rhinitis due to pollen    • Anemia    • Aphthous ulcer of mouth    • Backache    • Common cold    • Cough    • COVID    • Disorder of skin    • Dysuria    • Exanthematous disorder     numular eczema    • History of ITP    • History of regular medication use     long term    • History of respiratory therapy 03/07/2011    Nebulizer Treatment 58275 (1) - REJI Arredondo   • Hyperuricemia    • Increased frequency of urination    • Malaise and fatigue    • PONV (postoperative nausea and vomiting)    • Short stature disorder    • Thrombocytopenic purpura (HCC)        Family History   Problem  "Relation Age of Onset   • Cancer Other    • Diabetes Other    • Heart disease Other    • Hypertension Other    • Ulcers Other    • Other Other         Bleeding tendency   • No Known Problems Mother    • No Known Problems Father    • No Known Problems Sister    • No Known Problems Sister    • No Known Problems Sister    • No Known Problems Sister        Social History     Socioeconomic History   • Marital status: Single   Tobacco Use   • Smoking status: Never Smoker   • Smokeless tobacco: Never Used   Vaping Use   • Vaping Use: Never used   Substance and Sexual Activity   • Alcohol use: Yes     Comment: socially   • Drug use: No   • Sexual activity: Defer         Temp 98.7 °F (37.1 °C)   Ht 175.3 cm (69\")   Wt 68 kg (150 lb)   BMI 22.15 kg/m²     PHYSICAL EXAM  Physical Exam   Constitutional: He is oriented to person, place, and time. He appears well-developed and well-nourished.   HENT:   Head: Normocephalic and atraumatic.   Right Ear: External ear normal.   Left Ear: External ear normal.   Nose: Rhinorrhea and congestion present. Right sinus exhibits maxillary sinus tenderness (patient directed exam) and frontal sinus tenderness (patient directed exam). Left sinus exhibits maxillary sinus tenderness (patient directed exam) and frontal sinus tenderness (patient directed exam).   Mouth/Throat: Mucous membranes are erythematous.   Eyes: Lids are normal. Right eye exhibits no discharge and no exudate. Left eye exhibits no discharge and no exudate. Right conjunctiva is not injected. Left conjunctiva is not injected.   Pulmonary/Chest: No accessory muscle usage. No tachypnea and no bradypnea.  No respiratory distress.No use of oxygen by nasal cannulaNo use of oxygen by mask noted.  Abdominal: Abdomen appears normal.   Neurological: He is alert and oriented to person, place, and time. No cranial nerve deficit.   Skin: His skin appears normal.  Psychiatric: He has a normal mood and affect. His speech is normal and " behavior is normal. Judgment and thought content normal.       Results for orders placed or performed during the hospital encounter of 04/14/21   Tissue Pathology Exam    Specimen: Lymph Node; Tissue   Result Value Ref Range    Addendum       See scanned addendum report/stain results      Case Report       Surgical Pathology Report                         Case: [id number]                                  Authorizing Provider:  Salvador Lock,   Collected:           04/14/2021 11:11 AM                                 MD                                                                           Ordering Location:     [facility]             Received:            04/14/2021 01:08 PM                                 [place]                                                              Pathologist:           Tessie Pacheco MD                                                        Specimen:    Lymph Node, Left groin lymph node                                                          Final Diagnosis       SEE SCANNED REPORT           Diagnoses and all orders for this visit:    1. Acute URI (Primary)    2. Pansinusitis, unspecified chronicity    Other orders  -     azithromycin (Zithromax) 250 MG tablet; Take 2 tablets the first day, then 1 tablet daily for 4 days.  Dispense: 6 tablet; Refill: 0  -     guaiFENesin (Mucinex) 600 MG 12 hr tablet; Take 1 tablet by mouth 2 (Two) Times a Day for 14 days.  Dispense: 28 tablet; Refill: 0    Patient reports that he has taken azithromycin without adverse reactions  Probiotics for two weeks related to taking antibiotics. The pharmacist can help you with this if needed.  Mucinex with plenty of fluids especially water to thin secretions and help with congestion.    FOLLOW UP  If symptoms worsen or persist follow up with PCP,[facility] or Urgent Care    Patient verbalizes understanding of medication dosage, comfort measures, instructions for treatment and  follow-up.    Desiree Ward, APRN  12/10/2021  14:15 EST    This visit was performed via Telehealth.  This patient has been instructed to follow-up with their primary care provider if their symptoms worsen or the treatment provided does not resolve their illness.

## 2021-12-10 NOTE — PATIENT INSTRUCTIONS
Upper Respiratory Infection, Adult  An upper respiratory infection (URI) is a common viral infection of the nose, throat, and upper air passages that lead to the lungs. The most common type of URI is the common cold. URIs usually get better on their own, without medical treatment.  What are the causes?  A URI is caused by a virus. You may catch a virus by:  · Breathing in droplets from an infected person's cough or sneeze.  · Touching something that has been exposed to the virus (contaminated) and then touching your mouth, nose, or eyes.  What increases the risk?  You are more likely to get a URI if:  · You are very young or very old.  · It is shereen or winter.  · You have close contact with others, such as at a , school, or health care facility.  · You smoke.  · You have long-term (chronic) heart or lung disease.  · You have a weakened disease-fighting (immune) system.  · You have nasal allergies or asthma.  · You are experiencing a lot of stress.  · You work in an area that has poor air circulation.  · You have poor nutrition.  What are the signs or symptoms?  A URI usually involves some of the following symptoms:  · Runny or stuffy (congested) nose.  · Sneezing.  · Cough.  · Sore throat.  · Headache.  · Fatigue.  · Fever.  · Loss of appetite.  · Pain in your forehead, behind your eyes, and over your cheekbones (sinus pain).  · Muscle aches.  · Redness or irritation of the eyes.  · Pressure in the ears or face.  How is this diagnosed?  This condition may be diagnosed based on your medical history and symptoms, and a physical exam. Your health care provider may use a cotton swab to take a mucus sample from your nose (nasal swab). This sample can be tested to determine what virus is causing the illness.  How is this treated?  URIs usually get better on their own within 7-10 days. You can take steps at home to relieve your symptoms. Medicines cannot cure URIs, but your health care provider may recommend  certain medicines to help relieve symptoms, such as:  · Over-the-counter cold medicines.  · Cough suppressants. Coughing is a type of defense against infection that helps to clear the respiratory system, so take these medicines only as recommended by your health care provider.  · Fever-reducing medicines.  Follow these instructions at home:  Activity  · Rest as needed.  · If you have a fever, stay home from work or school until your fever is gone or until your health care provider says you are no longer contagious. Your health care provider may have you wear a face mask to prevent your infection from spreading.  Relieving symptoms  · Gargle with a salt-water mixture 3-4 times a day or as needed. To make a salt-water mixture, completely dissolve ½-1 tsp of salt in 1 cup of warm water.  · Use a cool-mist humidifier to add moisture to the air. This can help you breathe more easily.  Eating and drinking    · Drink enough fluid to keep your urine pale yellow.  · Eat soups and other clear broths.    General instructions    · Take over-the-counter and prescription medicines only as told by your health care provider. These include cold medicines, fever reducers, and cough suppressants.  · Do not use any products that contain nicotine or tobacco, such as cigarettes and e-cigarettes. If you need help quitting, ask your health care provider.  · Stay away from secondhand smoke.  · Stay up to date on all immunizations, including the yearly (annual) flu vaccine.  · Keep all follow-up visits as told by your health care provider. This is important.    How to prevent the spread of infection to others    · URIs can be passed from person to person (are contagious). To prevent the infection from spreading:  ? Wash your hands often with soap and water. If soap and water are not available, use hand .  ? Avoid touching your mouth, face, eyes, or nose.  ? Cough or sneeze into a tissue or your sleeve or elbow instead of into your  hand or into the air.    Contact a health care provider if:  · You are getting worse instead of better.  · You have a fever or chills.  · Your mucus is brown or red.  · You have yellow or brown discharge coming from your nose.  · You have pain in your face, especially when you bend forward.  · You have swollen neck glands.  · You have pain while swallowing.  · You have white areas in the back of your throat.  Get help right away if:  · You have shortness of breath that gets worse.  · You have severe or persistent:  ? Headache.  ? Ear pain.  ? Sinus pain.  ? Chest pain.  · You have chronic lung disease along with any of the following:  ? Wheezing.  ? Prolonged cough.  ? Coughing up blood.  ? A change in your usual mucus.  · You have a stiff neck.  · You have changes in your:  ? Vision.  ? Hearing.  ? Thinking.  ? Mood.  Summary  · An upper respiratory infection (URI) is a common infection of the nose, throat, and upper air passages that lead to the lungs.  · A URI is caused by a virus.  · URIs usually get better on their own within 7-10 days.  · Medicines cannot cure URIs, but your health care provider may recommend certain medicines to help relieve symptoms.  This information is not intended to replace advice given to you by your health care provider. Make sure you discuss any questions you have with your health care provider.  Document Revised: 12/26/2019 Document Reviewed: 08/03/2018  [employer] Patient Education © 2021 [employer]      Sinusitis, Adult  Sinusitis is inflammation of your sinuses. Sinuses are hollow spaces in the bones around your face. Your sinuses are located:  · Around your eyes.  · In the middle of your forehead.  · Behind your nose.  · In your cheekbones.  Mucus normally drains out of your sinuses. When your nasal tissues become inflamed or swollen, mucus can become trapped or blocked. This allows bacteria, viruses, and fungi to grow, which leads to infection. Most infections of the sinuses  are caused by a virus.  Sinusitis can develop quickly. It can last for up to 4 weeks (acute) or for more than 12 weeks (chronic). Sinusitis often develops after a cold.  What are the causes?  This condition is caused by anything that creates swelling in the sinuses or stops mucus from draining. This includes:  · Allergies.  · Asthma.  · Infection from bacteria or viruses.  · Deformities or blockages in your nose or sinuses.  · Abnormal growths in the nose (nasal polyps).  · Pollutants, such as chemicals or irritants in the air.  · Infection from fungi (rare).  What increases the risk?  You are more likely to develop this condition if you:  · Have a weak body defense system (immune system).  · Do a lot of swimming or diving.  · Overuse nasal sprays.  · Smoke.  What are the signs or symptoms?  The main symptoms of this condition are pain and a feeling of pressure around the affected sinuses. Other symptoms include:  · Stuffy nose or congestion.  · Thick drainage from your nose.  · Swelling and warmth over the affected sinuses.  · Headache.  · Upper toothache.  · A cough that may get worse at night.  · Extra mucus that collects in the throat or the back of the nose (postnasal drip).  · Decreased sense of smell and taste.  · Fatigue.  · A fever.  · Sore throat.  · Bad breath.  How is this diagnosed?  This condition is diagnosed based on:  · Your symptoms.  · Your medical history.  · A physical exam.  · Tests to find out if your condition is acute or chronic. This may include:  ? Checking your nose for nasal polyps.  ? Viewing your sinuses using a device that has a light (endoscope).  ? Testing for allergies or bacteria.  ? Imaging tests, such as an MRI or CT scan.  In rare cases, a bone biopsy may be done to rule out more serious types of fungal sinus disease.  How is this treated?  Treatment for sinusitis depends on the cause and whether your condition is chronic or acute.  · If caused by a virus, your symptoms should  go away on their own within 10 days. You may be given medicines to relieve symptoms. They include:  ? Medicines that shrink swollen nasal passages (topical intranasal decongestants).  ? Medicines that treat allergies (antihistamines).  ? A spray that eases inflammation of the nostrils (topical intranasal corticosteroids).  ? Rinses that help get rid of thick mucus in your nose (nasal saline washes).  · If caused by bacteria, your health care provider may recommend waiting to see if your symptoms improve. Most bacterial infections will get better without antibiotic medicine. You may be given antibiotics if you have:  ? A severe infection.  ? A weak immune system.  · If caused by narrow nasal passages or nasal polyps, you may need to have surgery.  Follow these instructions at home:  Medicines  · Take, use, or apply over-the-counter and prescription medicines only as told by your health care provider. These may include nasal sprays.  · If you were prescribed an antibiotic medicine, take it as told by your health care provider. Do not stop taking the antibiotic even if you start to feel better.  Hydrate and humidify    · Drink enough fluid to keep your urine pale yellow. Staying hydrated will help to thin your mucus.  · Use a cool mist humidifier to keep the humidity level in your home above 50%.  · Inhale steam for 10-15 minutes, 3-4 times a day, or as told by your health care provider. You can do this in the bathroom while a hot shower is running.  · Limit your exposure to cool or dry air.    Rest  · Rest as much as possible.  · Sleep with your head raised (elevated).  · Make sure you get enough sleep each night.  General instructions    · Apply a warm, moist washcloth to your face 3-4 times a day or as told by your health care provider. This will help with discomfort.  · Wash your hands often with soap and water to reduce your exposure to germs. If soap and water are not available, use hand .  · Do not  smoke. Avoid being around people who are smoking (secondhand smoke).  · Keep all follow-up visits as told by your health care provider. This is important.    Contact a health care provider if:  · You have a fever.  · Your symptoms get worse.  · Your symptoms do not improve within 10 days.  Get help right away if:  · You have a severe headache.  · You have persistent vomiting.  · You have severe pain or swelling around your face or eyes.  · You have vision problems.  · You develop confusion.  · Your neck is stiff.  · You have trouble breathing.  Summary  · Sinusitis is soreness and inflammation of your sinuses. Sinuses are hollow spaces in the bones around your face.  · This condition is caused by nasal tissues that become inflamed or swollen. The swelling traps or blocks the flow of mucus. This allows bacteria, viruses, and fungi to grow, which leads to infection.  · If you were prescribed an antibiotic medicine, take it as told by your health care provider. Do not stop taking the antibiotic even if you start to feel better.  · Keep all follow-up visits as told by your health care provider. This is important.  This information is not intended to replace advice given to you by your health care provider. Make sure you discuss any questions you have with your health care provider.  Document Revised: 05/20/2019 Document Reviewed: 05/20/2019  [employer] Patient Education © 2021 [employer].

## 2022-09-22 ENCOUNTER — TELEMEDICINE (OUTPATIENT)
Dept: FAMILY MEDICINE CLINIC | Facility: TELEHEALTH | Age: 32
End: 2022-09-22

## 2022-09-22 DIAGNOSIS — J02.9 ACUTE PHARYNGITIS, UNSPECIFIED ETIOLOGY: Primary | ICD-10-CM

## 2022-09-22 DIAGNOSIS — R05.9 COUGH: ICD-10-CM

## 2022-09-22 PROCEDURE — 99213 OFFICE O/P EST LOW 20 MIN: CPT | Performed by: NURSE PRACTITIONER

## 2022-09-22 RX ORDER — HYDROCODONE BITARTRATE AND ACETAMINOPHEN 5; 325 MG/1; MG/1
TABLET ORAL
COMMUNITY
Start: 2022-09-09 | End: 2023-03-07

## 2022-09-22 RX ORDER — LIDOCAINE 50 MG/G
OINTMENT TOPICAL
COMMUNITY
Start: 2022-08-10

## 2022-09-22 RX ORDER — AZITHROMYCIN 250 MG/1
TABLET, FILM COATED ORAL
Qty: 6 TABLET | Refills: 0 | Status: SHIPPED | OUTPATIENT
Start: 2022-09-22 | End: 2023-03-07

## 2022-09-22 RX ORDER — METHYLPREDNISOLONE 4 MG/1
TABLET ORAL
Qty: 21 TABLET | Refills: 0 | Status: SHIPPED | OUTPATIENT
Start: 2022-09-22 | End: 2023-03-07

## 2022-09-22 NOTE — PROGRESS NOTES
"CHIEF COMPLAINT  Chief Complaint   Patient presents with   • Sore Throat   • Cough         HPI  Swapnil Kyle is a 32 y.o. male  presents with complaint of cough and sore throat along with a stomach issue. He is day 7 of illness. Taking over the counter medications and some benzonatate caps. These medications has not helped.   He has a negative COVID-19 test.     Review of Systems   Constitutional: Negative for chills, diaphoresis, fatigue and fever.   HENT: Positive for sore throat (\"yellow tinge that I cough up.\" ).    Respiratory: Positive for cough. Negative for chest tightness, shortness of breath and wheezing.    Cardiovascular: Negative for chest pain.   Gastrointestinal: Positive for nausea. Negative for diarrhea and vomiting.   Musculoskeletal: Negative for myalgias.   Neurological: Positive for headaches.       Past Medical History:   Diagnosis Date   • Acute pharyngitis    • Allergic rhinitis    • Allergic rhinitis due to pollen    • Anemia    • Aphthous ulcer of mouth    • Backache    • Common cold    • Cough    • COVID    • Disorder of skin    • Dysuria    • Exanthematous disorder     numular eczema    • History of ITP    • History of regular medication use     long term    • History of respiratory therapy 03/07/2011    Nebulizer Treatment 76577 (4) - REJI Arredondo   • Hyperuricemia    • Increased frequency of urination    • Malaise and fatigue    • PONV (postoperative nausea and vomiting)    • Short stature disorder    • Thrombocytopenic purpura (HCC)        Family History   Problem Relation Age of Onset   • Cancer Other    • Diabetes Other    • Heart disease Other    • Hypertension Other    • Ulcers Other    • Other Other         Bleeding tendency   • No Known Problems Mother    • No Known Problems Father    • No Known Problems Sister    • No Known Problems Sister    • No Known Problems Sister    • No Known Problems Sister        Social History     Socioeconomic History   • Marital status: Single "   Tobacco Use   • Smoking status: Never Smoker   • Smokeless tobacco: Never Used   Vaping Use   • Vaping Use: Never used   Substance and Sexual Activity   • Alcohol use: Yes     Comment: socially   • Drug use: No   • Sexual activity: Defer       Swapnil Kyle  reports that he has never smoked. He has never used smokeless tobacco.    There were no vitals taken for this visit.    PHYSICAL EXAM  Physical Exam   Constitutional: He is oriented to person, place, and time. He appears well-developed and well-nourished. He does not have a sickly appearance. He does not appear ill. No distress.   HENT:   Head: Normocephalic and atraumatic.   Eyes: EOM are normal.   Neck: Neck normal appearance.  Pulmonary/Chest: Effort normal.  No respiratory distress.  Neurological: He is alert and oriented to person, place, and time.   Skin: Skin is dry.   Psychiatric: He has a normal mood and affect.         Diagnoses and all orders for this visit:    1. Acute pharyngitis, unspecified etiology (Primary)    2. Cough    Other orders  -     azithromycin (Zithromax Z-Sly) 250 MG tablet; Take 2 tablets by mouth on day 1, then 1 tablet daily on days 2-5  Dispense: 6 tablet; Refill: 0  -     methylPREDNISolone (MEDROL) 4 MG dose pack; Take as directed on package instructions.  Dispense: 21 tablet; Refill: 0        The use of a video visit has been reviewed with the patient and verbal informd consent has een obtained. Myself and Swapnil Kyle participated in this visit. The patient is located in [address] [place] [address]. I am located in [place], [place]. [employer] and Zoom were utilized.       Note Disclaimer: At [facility], we believe that sharing information builds trust and better   relationships. You are receiving this note because you recently visited [facility]. It is possible you   will see health information before a provider has talked with you about it. This kind of information can   be easy to  misunderstand. To help you fully understand what it means for your health, we urge you to   discuss this note with your provider.    Maggie St, DEONDRE  09/22/2022  13:42 EDT

## 2022-09-22 NOTE — PATIENT INSTRUCTIONS
Drink plenty of water  Over the counter pain relievers and cough medications okay   Gargle with warm salty water to help relieve sore throat pain   If symptoms do not improve in 3-5 days follow up with your primary care provider or urgent care    Pharyngitis    Pharyngitis is a sore throat (pharynx). This is when there is redness, pain, and swelling in your throat. Most of the time, this condition gets better on its own. In some cases, you may need medicine.  What are the causes?  An infection from a virus.  An infection from bacteria.  Allergies.  What increases the risk?  Being 5-24 years old.  Being in crowded environments. These include:  Daycares.  Schools.  Dormitories.  Living in a place with cold temperatures outside.  Having a weakened disease-fighting (immune) system.  What are the signs or symptoms?  Symptoms may vary depending on the cause. Common symptoms include:  Sore throat.  Tiredness (fatigue).  Low-grade fever.  Stuffy nose.  Cough.  Headache.  Other symptoms may include:  Glands in the neck (lymph nodes) that are swollen.  Skin rashes.  Film on the throat or tonsils. This can be caused by an infection from bacteria.  Vomiting.  Red, itchy eyes.  Loss of appetite.  Joint pain and muscle aches.  Tonsils that are temporarily bigger than usual (enlarged).  How is this treated?  Many times, treatment is not needed. This condition usually gets better in 3-4 days without treatment.  If the infection is caused by a bacteria, you may be need to take antibiotics.  Follow these instructions at home:  Medicines  Take over-the-counter and prescription medicines only as told by your doctor.  If you were prescribed an antibiotic medicine, take it as told by your doctor. Do not stop taking the antibiotic even if you start to feel better.  Use throat lozenges or sprays to soothe your throat as told by your doctor.  Children can get pharyngitis. Do not give your child aspirin.  Managing pain    To help with pain,  try:  Sipping warm liquids, such as:  Broth.  Herbal tea.  Warm water.  Eating or drinking cold or frozen liquids, such as frozen ice pops.  Rinsing your mouth (gargle) with a salt water mixture 3-4 times a day or as needed.  To make salt water, dissolve ½-1 tsp (3-6 g) of salt in 1 cup (237 mL) of warm water.  Do not swallow this mixture.  Sucking on hard candy or throat lozenges.  Putting a cool-mist humidifier in your bedroom at night to moisten the air.  Sitting in the bathroom with the door closed for 5-10 minutes while you run hot water in the shower.     General instructions    Do not smoke or use any products that contain nicotine or tobacco. If you need help quitting, ask your doctor.  Rest as told by your doctor.  Drink enough fluid to keep your pee (urine) pale yellow.  How is this prevented?  Wash your hands often for at least 20 seconds with soap and water. If soap and water are not available, use hand .  Do not touch your eyes, nose, or mouth with unwashed hands. Wash hands after touching these areas.  Do not share cups or eating utensils.  Avoid close contact with people who are sick.  Contact a doctor if:  You have large, tender lumps in your neck.  You have a rash.  You cough up green, yellow-brown, or bloody spit.  Get help right away if:  You have a stiff neck.  You drool or cannot swallow liquids.  You cannot drink or take medicines without vomiting.  You have very bad pain that does not go away with medicine.  You have problems breathing, and it is not from a stuffy nose.  You have new pain and swelling in your knees, ankles, wrists, or elbows.  These symptoms may be an emergency. Get help right away. Call your local emergency services (911 in the [place]  Do not wait to see if the symptoms will go away.  Do not drive yourself to the hospital.  Summary  Pharyngitis is a sore throat (pharynx). This is when there is redness, pain, and swelling in your throat.  Most of the time, pharyngitis  gets better on its own. Sometimes, you may need medicine.  If you were prescribed an antibiotic medicine, take it as told by your doctor. Do not stop taking the antibiotic even if you start to feel better.  This information is not intended to replace advice given to you by your health care provider. Make sure you discuss any questions you have with your health care provider.  Document Revised: 03/16/2022 Document Reviewed: 03/16/2022  [employer] Patient Education © 2022 [employer].

## 2022-09-30 NOTE — TELEPHONE ENCOUNTER
Incoming Refill Request      Medication requested (name and dose):   Pharmacy where request should be sent:     Additional details provided by patient:   Pt called and would like to come in earlier than his scsheduled appt. He is hurting all over, head aches and just never feels good. Please call       By Jory Goode            Best call back number:     Does the patient have less than a 3 day supply:  [] Yes  [] No    Jory Goode  09/30/22, 15:18 CDT

## 2023-03-07 ENCOUNTER — TELEMEDICINE (OUTPATIENT)
Dept: FAMILY MEDICINE CLINIC | Facility: TELEHEALTH | Age: 33
End: 2023-03-07
Payer: MEDICAID

## 2023-03-07 VITALS — WEIGHT: 150 LBS | BODY MASS INDEX: 22.22 KG/M2 | HEIGHT: 69 IN

## 2023-03-07 DIAGNOSIS — B35.4 RINGWORM OF BODY: Primary | ICD-10-CM

## 2023-03-07 PROCEDURE — 99213 OFFICE O/P EST LOW 20 MIN: CPT | Performed by: NURSE PRACTITIONER

## 2023-03-07 RX ORDER — CLOTRIMAZOLE AND BETAMETHASONE DIPROPIONATE 10; .64 MG/G; MG/G
1 CREAM TOPICAL 2 TIMES DAILY
Qty: 28 G | Refills: 0 | Status: SHIPPED | OUTPATIENT
Start: 2023-03-07 | End: 2023-03-21

## 2023-03-07 NOTE — PROGRESS NOTES
You have chosen to receive care through a telehealth visit.  Do you consent to use a video/audio connection for your medical care today? Yes     CHIEF COMPLAINT  Cc: rash    HPI  Swapnil Kyle is a 32 y.o. male  presents with complaint of rash. He reports that he had a rash for about 5-6 weeks on his leg. He also reports that it looks like a ringworm. There are also two other small ones. He reports that it started end of 01/2023. He has been putting clotrimazole on it for several weeks and it looks slightly better but is not gone.    Review of Systems   Constitutional: Negative for fever.   HENT: Negative for sore throat.    Respiratory: Negative for cough.    Cardiovascular: Negative for chest pain.   Skin: Positive for rash (on your left calf two spots and right one).       Past Medical History:   Diagnosis Date   • Acute pharyngitis    • Allergic rhinitis    • Allergic rhinitis due to pollen    • Anemia    • Aphthous ulcer of mouth    • Backache    • Common cold    • Cough    • COVID    • Disorder of skin    • Dysuria    • Exanthematous disorder     numular eczema    • History of ITP    • History of regular medication use     long term    • History of respiratory therapy 03/07/2011    Nebulizer Treatment 12087 (8) - REJI Arredondo   • Hyperuricemia    • Increased frequency of urination    • Malaise and fatigue    • PONV (postoperative nausea and vomiting)    • Short stature disorder    • Thrombocytopenic purpura (HCC)        Family History   Problem Relation Age of Onset   • Cancer Other    • Diabetes Other    • Heart disease Other    • Hypertension Other    • Ulcers Other    • Other Other         Bleeding tendency   • No Known Problems Mother    • No Known Problems Father    • No Known Problems Sister    • No Known Problems Sister    • No Known Problems Sister    • No Known Problems Sister        Social History     Socioeconomic History   • Marital status: Single   Tobacco Use   • Smoking status: Never   •  "Smokeless tobacco: Never   Vaping Use   • Vaping Use: Never used   Substance and Sexual Activity   • Alcohol use: Yes     Comment: socially   • Drug use: No   • Sexual activity: Defer       Swapnil Kyle  reports that he has never smoked. He has never used smokeless tobacco..  Ht 175.3 cm (69\")   Wt 68 kg (150 lb)   BMI 22.15 kg/m²     PHYSICAL EXAM  Physical Exam   Constitutional: He is oriented to person, place, and time. He appears well-developed and well-nourished.   HENT:   Head: Normocephalic and atraumatic.   Right Ear: External ear normal.   Left Ear: External ear normal.   Nose: Nose normal.   Eyes: Lids are normal. Right eye exhibits no discharge and no exudate. Left eye exhibits no discharge and no exudate. Right conjunctiva is not injected. Left conjunctiva is not injected.   Pulmonary/Chest: No accessory muscle usage. No tachypnea and no bradypnea.  No respiratory distress.No use of oxygen by nasal cannulaNo use of oxygen by mask noted.  Neurological: He is alert and oriented to person, place, and time. No cranial nerve deficit.   Skin:   ring like lesion quarter sized left calf, two smaller lesions, one on right and one on left calf circular, erythemtous   Psychiatric: He has a normal mood and affect. His speech is normal and behavior is normal. Judgment and thought content normal.       Results for orders placed or performed during the hospital encounter of 04/14/21   Tissue Pathology Exam    Specimen: Lymph Node; Tissue   Result Value Ref Range    Addendum       See scanned addendum report/stain results      Case Report       Surgical Pathology Report                         Case: [id number]                                  Authorizing Provider:  Salvador Lock,   Collected:           04/14/2021 11:11 AM                                 MD                                                                           Ordering Location:     [facility]             Received:            " 04/14/2021 01:08 PM                                 [place]                                                              Pathologist:           Tessie Pacheco MD                                                        Specimen:    Lymph Node, Left groin lymph node                                                          Final Diagnosis       SEE SCANNED REPORT           Diagnoses and all orders for this visit:    1. Ringworm of body (Primary)    Other orders  -     clotrimazole-betamethasone (Lotrisone) 1-0.05 % cream; Apply 1 application topically to the appropriate area as directed 2 (Two) Times a Day for 14 days.  Dispense: 28 g; Refill: 0    Wash with clean cloth mild soap and water and apply Lotrisone as directed  Keep open to air as much as possible at home and covered when out   Wash towels and linens frequently    FOLLOW-UP  If symptoms worsen or persist follow up with PCP.Virtual Care or Urgent Care    Patient verbalizes understanding of medication dosage, comfort measures, instructions for treatment and follow-up.    Desiree Ward, APRN  03/07/2023  16:31 EST    The use of a video visit has been reviewed with the patient and verbal informed consent has been obtained. Myself and Swapnil Kyle participated in this visit. The patient is located in [address] [place] [address].    I am located in [place], [place]. [facility] and [employer] Video Client were utilized. I spent 25 minutes in the patient's chart for this visit.

## 2023-06-10 NOTE — ANESTHESIA PROCEDURE NOTES
Airway  Urgency: elective      General Information and Staff    Patient location during procedure: OR  CRNA: Neeru Hutchins CRNA    Indications and Patient Condition  Indications for airway management: airway protection    Preoxygenated: yes      Final Airway Details  Final airway type: supraglottic airway      Successful airway: I-gel  Size 4    Number of attempts at approach: 1              
Peripheral Block    Pre-sedation assessment completed: 2/20/2019 7:05 AM    Patient reassessed immediately prior to procedure    Patient location during procedure: OR  Start time: 2/20/2019 7:11 AM  Stop time: 2/20/2019 7:18 AM  Reason for block: at surgeon's request and post-op pain management  Performed by  Anesthesiologist: Gordon Mart MD  Preanesthetic Checklist  Completed: patient identified, site marked, surgical consent, pre-op evaluation, timeout performed, IV checked, risks and benefits discussed and monitors and equipment checked  Prep:  Pt Position: supine  Sterile barriers:cap, gloves and mask  Prep: ChloraPrep  Patient monitoring: blood pressure monitoring, continuous pulse oximetry and EKG  Procedure  Performed under: MAC  Guidance:ultrasound guided  ULTRASOUND INTERPRETATION.  Using ultrasound guidance a 22 G gauge needle was placed in close proximity to the brachial plexus nerve, at which point, under ultrasound guidance anesthetic was injected in the area of the nerve and spread of the anesthesia was seen on ultrasound in close proximity thereto.  There were no abnormalities seen on ultrasound; a digital image was taken; and the patient tolerated the procedure with no complications. Images:still images obtained    Laterality:left  Block Type:interscalene  Injection Technique:single-shot  Needle Type:echogenic  Needle Gauge:22 G    Medications Used: ropivacaine (NAROPIN) 0.5 % injection, 20 mL  Medications  Comment:Pt ID'd  Ultrasound guided  Needle seen throughout  Local infiltration appropriate    Post Assessment  Injection Assessment: negative aspiration for heme, no paresthesia on injection and incremental injection  Patient Tolerance:comfortable throughout block  Complications:no            
General

## 2024-01-08 ENCOUNTER — TELEMEDICINE (OUTPATIENT)
Dept: FAMILY MEDICINE CLINIC | Facility: TELEHEALTH | Age: 34
End: 2024-01-08
Payer: MEDICAID

## 2024-01-08 DIAGNOSIS — L98.9 SKIN LESIONS: Primary | ICD-10-CM

## 2024-01-08 RX ORDER — TRAZODONE HYDROCHLORIDE 100 MG/1
100 TABLET ORAL
COMMUNITY
Start: 2023-11-08 | End: 2024-05-07

## 2024-01-08 RX ORDER — HYDROCODONE BITARTRATE AND ACETAMINOPHEN 5; 325 MG/1; MG/1
TABLET ORAL
COMMUNITY
Start: 2023-12-26

## 2024-01-08 RX ORDER — CEPHALEXIN 500 MG/1
500 CAPSULE ORAL 4 TIMES DAILY
Qty: 20 CAPSULE | Refills: 0 | Status: SHIPPED | OUTPATIENT
Start: 2024-01-08 | End: 2024-01-13

## 2024-01-08 NOTE — PATIENT INSTRUCTIONS
Take medicine as prescribed.  Keep skin clean and dry.    Follow-up with dermatology if symptoms do not improve.    If symptoms worsen or do not improve follow up with your PCP or visit your nearest Urgent Care Center or ER.

## 2024-01-08 NOTE — PROGRESS NOTES
"Subjective   Chief Complaint   Patient presents with    Rash       Swapnil Kyle is a 33 y.o. male.     History of Present Illness  Patient presents with a scattered lesions on both legs and a single lesion on the left arm for the past 1 to 2 months.  He states the lesions are mildly pruritic and are of annular shape.  He thinks the lesions may be ringworms but is not sure.  He has had a ringworm in the past but it did not affect both legs and the lesion was larger.  He does report going to the gym frequently.  Rash  This is a new problem. Episode onset: 1-2 months. The problem has been worsening since onset. The affected locations include the left lower leg, right lower leg and left arm. The rash is characterized by redness and itchiness. It is unknown if there was an exposure to a precipitant. Pertinent negatives include no anorexia, congestion, cough, diarrhea, eye pain, facial edema, fatigue, fever, joint pain, nail changes, rhinorrhea, shortness of breath, sore throat or vomiting. Treatments tried: antifungal cream.        Allergies   Allergen Reactions    Augmentin [Amoxicillin-Pot Clavulanate] Other (See Comments)     \"dont know\"    Erythromycin Ethylsuccinate Other (See Comments)     \"dont know\"    Other Other (See Comments)     PEDIAZOLE    Tramadol Hives    Codeine Rash    Sulfa Antibiotics Rash    Zanamivir Rash       Past Medical History:   Diagnosis Date    Acute pharyngitis     Allergic rhinitis     Allergic rhinitis due to pollen     Anemia     Aphthous ulcer of mouth     Backache     Common cold     Cough     COVID     Disorder of skin     Dysuria     Exanthematous disorder     numular eczema     History of ITP     History of regular medication use     long term     History of respiratory therapy 03/07/2011    Nebulizer Treatment 06444 (1) - REJI Arredondo    Hyperuricemia     Increased frequency of urination     Malaise and fatigue     PONV (postoperative nausea and vomiting)     Short stature " disorder     Thrombocytopenic purpura        Past Surgical History:   Procedure Laterality Date    LYMPH NODE BIOPSY Left 4/14/2021    Procedure: Excision of left groin lymph node;  Surgeon: Salvador Lock MD;  Location: [facility];  Service: General;  Laterality: Left;    MYRINGOTOMY Bilateral 06/23/1992    TUBE IMPLANT GENERAL ANESTHETIC 34342 (1) - Bilateral myringotomies w/ Fernández ventilating tube implants. examination of adenoids    NOSE SURGERY  02/22/2002    Removal of the nasal packing under general anesthetic & inspection of nasal cavity. Patient has nasal packing that needs to be removed    OTHER SURGICAL HISTORY  03/07/2011    Biopsy, Each Additional Lesion 38485 (1) - M. Hack    SHOULDER ARTHROSCOPY W/ LABRAL REPAIR Left 2/20/2019    Procedure: SHOULDER ARTHROSCOPY with revision anterior labral repair, SLAP repair, and subacromial decompression;  Surgeon: Salvador Hutchison MD;  Location: [facility];  Service: Orthopedics    SHOULDER ARTHROSCOPY W/ SUPERIOR LABRAL ANTERIOR POSTERIOR REPAIR Left 8/30/2018    Procedure: DIAGNOSTIC ARTHROSCOPY LEFT SHOULDER WITH LABRAL REPAIR AS INDICATED;  Surgeon: Lawrence Serrano MD;  Location: [facility];  Service: Orthopedics    SHOULDER ARTHROSCOPY WITH SUBACROMIAL DECOMPRESSION Left 2/20/2019    Procedure: POSSIBLE CAPSULAR PLICATION  ANTERIORLY, POSTERIOR  PROCEDURE AS INDICATED           (PAC BUS);  Surgeon: Salvador Hutchison MD;  Location: [facility];  Service: Orthopedics    SPLENECTOMY      TONSILLECTOMY AND ADENOIDECTOMY Bilateral 10/19/1993    Bilateral myringotomies with Durivent tube implants. T&A. Chronic recurrent bilateral secretory otitis media. Marked enlargement of the tonsils & adenoids causing infection       Social History     Socioeconomic History    Marital status: Single   Tobacco Use    Smoking status: Never    Smokeless tobacco: Never   Vaping Use    Vaping Use: Never used   Substance and Sexual Activity    Alcohol use:  Yes     Comment: socially    Drug use: No    Sexual activity: Defer       Family History   Problem Relation Age of Onset    Cancer Other     Diabetes Other     Heart disease Other     Hypertension Other     Ulcers Other     Other Other         Bleeding tendency    No Known Problems Mother     No Known Problems Father     No Known Problems Sister     No Known Problems Sister     No Known Problems Sister     No Known Problems Sister          Current Outpatient Medications:     HYDROcodone-acetaminophen (NORCO) 5-325 MG per tablet, , Disp: , Rfl:     traZODone (DESYREL) 100 MG tablet, Take 1 tablet by mouth., Disp: , Rfl:     cephalexin (Keflex) 500 MG capsule, Take 1 capsule by mouth 4 (Four) Times a Day for 5 days., Disp: 20 capsule, Rfl: 0    lidocaine (XYLOCAINE) 5 % ointment, , Disp: , Rfl:     mupirocin (BACTROBAN) 2 % ointment, Apply 1 application  topically to the appropriate area as directed 3 (Three) Times a Day., Disp: 45 g, Rfl: 0      Review of Systems   Constitutional:  Negative for chills, diaphoresis, fatigue and fever.   HENT:  Negative for congestion, rhinorrhea and sore throat.    Eyes:  Negative for pain.   Respiratory:  Negative for cough, chest tightness, shortness of breath and wheezing.    Gastrointestinal:  Negative for anorexia, diarrhea and vomiting.   Musculoskeletal:  Negative for joint pain.   Skin:  Positive for rash. Negative for nail changes.   Neurological:  Negative for headache.        There were no vitals filed for this visit.    Objective   Physical Exam  Constitutional:       General: He is not in acute distress.     Appearance: Normal appearance. He is not ill-appearing, toxic-appearing or diaphoretic.   HENT:      Head: Normocephalic.      Mouth/Throat:      Lips: Pink.      Mouth: Mucous membranes are moist.   Pulmonary:      Effort: Pulmonary effort is normal.   Skin:     Findings: Erythema and lesion present.             Comments: Small erythematous lesions scattered  sporadically on the lower legs and a single lesion on the left forearm.  Lesions do not have central clearing.   Neurological:      Mental Status: He is alert and oriented to person, place, and time.          Procedures     Assessment & Plan   Diagnoses and all orders for this visit:    1. Skin lesions (Primary)  -     mupirocin (BACTROBAN) 2 % ointment; Apply 1 application  topically to the appropriate area as directed 3 (Three) Times a Day.  Dispense: 45 g; Refill: 0  -     cephalexin (Keflex) 500 MG capsule; Take 1 capsule by mouth 4 (Four) Times a Day for 5 days.  Dispense: 20 capsule; Refill: 0            PLAN: Advised patient that the distribution is not similar to that of ringworm and the lesions are very small without central clearing and do not resemble classic ringworm.  Will try antibiotics and mupirocin ointment.  Advised patient to follow-up with dermatology if no improvement.  Patient is agreeable to plan.     Discussed dosing, side effects, recommended other symptomatic care.  Patient should follow up with primary care provider, Urgent Care or ER if symptoms worsen, fail to resolve or other symptoms need attention. Patient/family agree to the above.         DEONDRE De La Vega     The use of a video visit has been reviewed with the patient and verbal informed consent has been obtained. Myself and Swapnil Kyle participated in this visit. The patient is located at [address] [place] [address]. I am located in [place], [place]. My[employer] and Zoom were utilized.        This visit was performed via Telehealth.  This patient has been instructed to follow-up with their primary care provider if their symptoms worsen or the treatment provided does not resolve their illness.

## 2024-04-19 ENCOUNTER — TELEMEDICINE (OUTPATIENT)
Dept: FAMILY MEDICINE CLINIC | Facility: TELEHEALTH | Age: 34
End: 2024-04-19
Payer: MEDICAID
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
## 2024-04-19 DIAGNOSIS — S80.261A INSECT BITE OF RIGHT KNEE, INITIAL ENCOUNTER: Primary | ICD-10-CM

## 2024-04-19 RX ORDER — DOXYCYCLINE HYCLATE 100 MG/1
100 CAPSULE ORAL 2 TIMES DAILY
Qty: 20 CAPSULE | Refills: 0 | Status: SHIPPED | OUTPATIENT
Start: 2024-04-19 | End: 2024-04-29

## 2024-04-19 RX ORDER — DIPHENHYDRAMINE HCL 25 MG
25 TABLET ORAL EVERY 6 HOURS PRN
Qty: 20 TABLET | Refills: 0 | Status: SHIPPED | OUTPATIENT
Start: 2024-04-19

## 2024-04-19 RX ORDER — PREDNISONE 10 MG/1
TABLET ORAL
Qty: 21 TABLET | Refills: 0 | Status: SHIPPED | OUTPATIENT
Start: 2024-04-19

## 2024-04-19 RX ORDER — TRIAMCINOLONE ACETONIDE 1 MG/G
1 OINTMENT TOPICAL 2 TIMES DAILY PRN
Qty: 30 G | Refills: 0 | Status: SHIPPED | OUTPATIENT
Start: 2024-04-19 | End: 2024-04-26

## 2024-04-19 NOTE — PROGRESS NOTES
HPI  Swapnil Kyle is a 33 y.o. male  presents with complaint of insect bite 3 days ago but unknown what it was from. Had target-like appearance with small hole in center when first happened but now solid red. Itching has been there since first occurred and now having clear drainage. Feels like entire knee is swollen. Denies fever, chills, sweats, ambulation changes. Has taken tylenol, aloe vera, and peroxide which only provide mild relief.     Review of Systems    Past Medical History:   Diagnosis Date    Acute pharyngitis     Allergic rhinitis     Allergic rhinitis due to pollen     Anemia     Aphthous ulcer of mouth     Backache     Common cold     Cough     COVID     Disorder of skin     Dysuria     Exanthematous disorder     numular eczema     History of ITP     History of regular medication use     long term     History of respiratory therapy 03/07/2011    Nebulizer Treatment 01614 (3) - MOscar Arredondo    Hyperuricemia     Increased frequency of urination     Malaise and fatigue     PONV (postoperative nausea and vomiting)     Short stature disorder     Thrombocytopenic purpura        Family History   Problem Relation Age of Onset    Cancer Other     Diabetes Other     Heart disease Other     Hypertension Other     Ulcers Other     Other Other         Bleeding tendency    No Known Problems Mother     No Known Problems Father     No Known Problems Sister     No Known Problems Sister     No Known Problems Sister     No Known Problems Sister        Social History     Socioeconomic History    Marital status: Single   Tobacco Use    Smoking status: Never    Smokeless tobacco: Never   Vaping Use    Vaping status: Never Used   Substance and Sexual Activity    Alcohol use: Yes     Comment: socially    Drug use: No    Sexual activity: Defer         There were no vitals taken for this visit.    PHYSICAL EXAM  Physical Exam   Constitutional: He appears well-developed and well-nourished.   HENT:   Head: Normocephalic.    Nose: Nose normal.   Neck: Neck normal appearance.  Pulmonary/Chest: Effort normal.   Musculoskeletal:        Legs:    Neurological: He is alert.   Psychiatric: He has a normal mood and affect. His speech is normal.       Diagnoses and all orders for this visit:    1. Insect bite of right knee, initial encounter (Primary)  -     diphenhydrAMINE (Benadryl Allergy) 25 MG tablet; Take 1 tablet by mouth Every 6 (Six) Hours As Needed for Itching.  Dispense: 20 tablet; Refill: 0  -     predniSONE (DELTASONE) 10 MG tablet; Prednisone 10mg tablet taper pack for 6 days as directed  Dispense: 21 tablet; Refill: 0  -     doxycycline (VIBRAMYCIN) 100 MG capsule; Take 1 capsule by mouth 2 (Two) Times a Day for 10 days.  Dispense: 20 capsule; Refill: 0  -     triamcinolone (KENALOG) 0.1 % ointment; Apply 1 Application topically to the appropriate area as directed 2 (Two) Times a Day As Needed for Rash for up to 7 days. Steroid cream  Dispense: 30 g; Refill: 0  -     mupirocin (BACTROBAN) 2 % ointment; Apply 1 Application topically to the appropriate area as directed 3 (Three) Times a Day for 7 days. Antibiotic  Dispense: 22 g; Refill: 0          FOLLOW-UP  As discussed during visit with [facility], if symptoms worsen or fail to improve, follow-up with PCP/Urgent Care/Emergency Department.    Patient verbalizes understanding of medications, instructions for treatment and follow-up.    Sudha Simpson, APRN  04/19/2024  16:16 EDT    The use of a video visit has been reviewed with the patient and verbal informed consent has been obtained. Myself and Swapnil Kyle participated in this visit. The patient is located in [place], [place], and I am located in [place], [place]. [employer] and [employer] Video Client were utilized.

## 2024-07-29 ENCOUNTER — TELEMEDICINE (OUTPATIENT)
Dept: FAMILY MEDICINE CLINIC | Facility: TELEHEALTH | Age: 34
End: 2024-07-29
Payer: MEDICAID

## 2024-07-29 DIAGNOSIS — B34.9 VIRAL ILLNESS: Primary | ICD-10-CM

## 2024-07-29 PROCEDURE — 99213 OFFICE O/P EST LOW 20 MIN: CPT | Performed by: NURSE PRACTITIONER

## 2024-07-29 RX ORDER — PREDNISONE 20 MG/1
20 TABLET ORAL 2 TIMES DAILY
Qty: 14 TABLET | Refills: 0 | Status: SHIPPED | OUTPATIENT
Start: 2024-07-29 | End: 2024-08-05

## 2024-07-29 RX ORDER — BENZONATATE 100 MG/1
100 CAPSULE ORAL 3 TIMES DAILY PRN
Qty: 21 CAPSULE | Refills: 0 | Status: SHIPPED | OUTPATIENT
Start: 2024-07-29

## 2024-07-29 NOTE — PROGRESS NOTES
Subjective   Swapnil Kyle is a 33 y.o. male.     History of Present Illness  He has been sick for a week 7/21 with chills, body aches, night sweats, abdominal bloating and pain, sore throat, cough, his symptoms have improved. Today he still has body aches, stomach, painful cough that keeps him up at night. He has tried promethazine DM.  He took 2 home covid tests which were negative. Denies sick contacts. He did get a flu shot. 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        The following portions of the patient's history were reviewed and updated as appropriate: allergies, current medications, past family history, past medical history, past social history, past surgical history, and problem list.    Review of Systems   Constitutional:  Negative for fatigue and fever (tmax 101 has now resolved).   HENT:  Negative for congestion, ear pain, rhinorrhea and sinus pressure.    Respiratory:  Positive for cough (painful). Negative for shortness of breath and wheezing.    Gastrointestinal:  Positive for abdominal distention. Negative for diarrhea, nausea and vomiting.   Musculoskeletal:  Positive for myalgias.   Neurological:  Positive for headache.       Objective   Physical Exam  Constitutional:       General: He is not in acute distress.     Appearance: He is well-developed. He is not diaphoretic.   Pulmonary:      Effort: Pulmonary effort is normal.   Neurological:      Mental Status: He is alert and oriented to person, place, and time.   Psychiatric:         Behavior: Behavior normal.           Assessment & Plan   Diagnoses and all orders for this visit:    1. Viral illness (Primary)  -     predniSONE (DELTASONE) 20 MG tablet; Take 1 tablet by mouth 2 (Two) Times a Day for 7 days.  Dispense: 14 tablet; Refill: 0  -     benzonatate (Tessalon Perles) 100 MG capsule; Take 1 capsule by mouth 3 (Three) Times a Day As Needed for Cough.  Dispense: 21 capsule; Refill: 0                 The use of a video visit has been reviewed  with the patient and verbal informed consent has been obtained. Myself and Swapnil Kyle participated in this visit. The patient is located in  [place], [place] . I am located in [place], [place]. [employer] [facility] and [employer] Video Client were utilized. I spent 17 minutes in the patient's chart for this visit.

## 2024-07-29 NOTE — PATIENT INSTRUCTIONS
Push fluids, rest  Tylenol/ibuprofen for pain or fever>101  Saline nasal spray/irrigation, humidified air for sinus symptoms  Flonase, mucinex with a full glass of water for congestion  Do not suppress your cough unless it prevents you from resting  Follow up if symptoms worsen or do not improve in 5 days

## 2025-03-25 ENCOUNTER — TELEMEDICINE (OUTPATIENT)
Dept: FAMILY MEDICINE CLINIC | Facility: TELEHEALTH | Age: 35
End: 2025-03-25
Payer: MEDICAID

## 2025-03-25 DIAGNOSIS — J06.9 VIRAL UPPER RESPIRATORY TRACT INFECTION: Primary | ICD-10-CM

## 2025-03-25 PROCEDURE — 99213 OFFICE O/P EST LOW 20 MIN: CPT | Performed by: NURSE PRACTITIONER

## 2025-03-25 RX ORDER — PREDNISONE 20 MG/1
20 TABLET ORAL 2 TIMES DAILY
Qty: 10 TABLET | Refills: 0 | Status: SHIPPED | OUTPATIENT
Start: 2025-03-25 | End: 2025-03-30

## 2025-03-25 RX ORDER — FLUTICASONE PROPIONATE 50 MCG
2 SPRAY, SUSPENSION (ML) NASAL DAILY
Qty: 16 G | Refills: 0 | Status: SHIPPED | OUTPATIENT
Start: 2025-03-25

## 2025-03-25 RX ORDER — BROMPHENIRAMINE MALEATE, PSEUDOEPHEDRINE HYDROCHLORIDE, AND DEXTROMETHORPHAN HYDROBROMIDE 2; 30; 10 MG/5ML; MG/5ML; MG/5ML
10 SYRUP ORAL 4 TIMES DAILY PRN
Qty: 240 ML | Refills: 0 | Status: SHIPPED | OUTPATIENT
Start: 2025-03-25

## 2025-03-25 RX ORDER — LIDOCAINE 50 MG/G
1 OINTMENT TOPICAL
COMMUNITY
Start: 2025-01-08

## 2025-03-25 RX ORDER — HYDROCODONE BITARTRATE AND ACETAMINOPHEN 5; 325 MG/1; MG/1
1 TABLET ORAL EVERY 6 HOURS PRN
COMMUNITY
Start: 2025-01-15

## 2025-03-25 RX ORDER — CLOTRIMAZOLE AND BETAMETHASONE DIPROPIONATE 10; .64 MG/G; MG/G
CREAM TOPICAL
COMMUNITY
Start: 2025-02-13

## 2025-03-25 RX ORDER — TRAZODONE HYDROCHLORIDE 100 MG/1
100 TABLET ORAL
COMMUNITY
Start: 2025-02-13 | End: 2025-08-13

## 2025-03-25 NOTE — PROGRESS NOTES
"Subjective   Chief Complaint   Patient presents with    URI       Swapnil Kyle is a 34 y.o. male.     History of Present Illness  Pt reports fatigue, body aches, cough, coughing fits, drainage and nasal congestion. His main complaint is nasal congestion, nasal passages are inflamed, he is not able to breathe from his nose or even blow his nose.  His grandmother was sick with similar symptoms, she tested negative for COVID at the ER and was diagnosed with a virus.  Patient's home COVID test was also negative.  URI   This is a new problem. Episode onset: 2-3 days. The problem has been unchanged. Associated symptoms include congestion and coughing. Pertinent negatives include no chest pain, diarrhea, nausea, vomiting or wheezing.        Allergies   Allergen Reactions    Zanamivir Rash    Augmentin [Amoxicillin-Pot Clavulanate] Other (See Comments)     \"dont know\"    Erythromycin Ethylsuccinate Other (See Comments)     \"dont know\"......can tolerate z-pack    Other Other (See Comments)     PEDIAZOLE    Tramadol Hives    Codeine Rash    Sulfa Antibiotics Rash       Past Medical History:   Diagnosis Date    Acute pharyngitis     Allergic rhinitis     Allergic rhinitis due to pollen     Anemia     Aphthous ulcer of mouth     Backache     Common cold     Cough     COVID     Disorder of skin     Dysuria     Exanthematous disorder     numular eczema     History of ITP     History of regular medication use     long term     History of respiratory therapy 03/07/2011    Nebulizer Treatment 83273 (1) - REJI Arredondo    Hyperuricemia     Increased frequency of urination     Malaise and fatigue     PONV (postoperative nausea and vomiting)     Short stature disorder     Thrombocytopenic purpura        Past Surgical History:   Procedure Laterality Date    LYMPH NODE BIOPSY Left 4/14/2021    Procedure: Excision of left groin lymph node;  Surgeon: Salvador Lock MD;  Location: [facility];  Service: General;  Laterality: " Left;    MYRINGOTOMY Bilateral 06/23/1992    TUBE IMPLANT GENERAL ANESTHETIC 50483 (1) - Bilateral myringotomies w/ Fernández ventilating tube implants. examination of adenoids    NOSE SURGERY  02/22/2002    Removal of the nasal packing under general anesthetic & inspection of nasal cavity. Patient has nasal packing that needs to be removed    OTHER SURGICAL HISTORY  03/07/2011    Biopsy, Each Additional Lesion 38858 (1) - M. Sergeyck    SHOULDER ARTHROSCOPY W/ LABRAL REPAIR Left 2/20/2019    Procedure: SHOULDER ARTHROSCOPY with revision anterior labral repair, SLAP repair, and subacromial decompression;  Surgeon: Salvador Hutchison MD;  Location: [facility] OR;  Service: Orthopedics    SHOULDER ARTHROSCOPY W/ SUPERIOR LABRAL ANTERIOR POSTERIOR REPAIR Left 8/30/2018    Procedure: DIAGNOSTIC ARTHROSCOPY LEFT SHOULDER WITH LABRAL REPAIR AS INDICATED;  Surgeon: Lawrence Serrano MD;  Location: [facility] OR;  Service: Orthopedics    SHOULDER ARTHROSCOPY WITH SUBACROMIAL DECOMPRESSION Left 2/20/2019    Procedure: POSSIBLE CAPSULAR PLICATION  ANTERIORLY, POSTERIOR  PROCEDURE AS INDICATED           (PAC BUS);  Surgeon: Salvador Hutchison MD;  Location: [facility] OR;  Service: Orthopedics    SPLENECTOMY      TONSILLECTOMY AND ADENOIDECTOMY Bilateral 10/19/1993    Bilateral myringotomies with Durivent tube implants. T&A. Chronic recurrent bilateral secretory otitis media. Marked enlargement of the tonsils & adenoids causing infection       Social History     Socioeconomic History    Marital status: Single   Tobacco Use    Smoking status: Never    Smokeless tobacco: Never   Vaping Use    Vaping status: Never Used   Substance and Sexual Activity    Alcohol use: Yes     Comment: socially    Drug use: No    Sexual activity: Defer       Family History   Problem Relation Age of Onset    Cancer Other     Diabetes Other     Heart disease Other     Hypertension Other     Ulcers Other     Other Other         Bleeding tendency    No  Known Problems Mother     No Known Problems Father     No Known Problems Sister     No Known Problems Sister     No Known Problems Sister     No Known Problems Sister          Current Outpatient Medications:     clotrimazole-betamethasone (LOTRISONE) 1-0.05 % cream, , Disp: , Rfl:     HYDROcodone-acetaminophen (NORCO) 5-325 MG per tablet, Take 1 tablet by mouth Every 6 (Six) Hours As Needed., Disp: , Rfl:     lidocaine (XYLOCAINE) 5 % ointment, Apply 1 tube topically to the appropriate area as directed., Disp: , Rfl:     traZODone (DESYREL) 100 MG tablet, Take 1 tablet by mouth., Disp: , Rfl:     benzonatate (Tessalon Perles) 100 MG capsule, Take 1 capsule by mouth 3 (Three) Times a Day As Needed for Cough., Disp: 21 capsule, Rfl: 0    brompheniramine-pseudoephedrine-DM 30-2-10 MG/5ML syrup, Take 10 mL by mouth 4 (Four) Times a Day As Needed for Congestion, Allergies or Cough., Disp: 240 mL, Rfl: 0    fluticasone (FLONASE) 50 MCG/ACT nasal spray, Administer 2 sprays into the nostril(s) as directed by provider Daily., Disp: 16 g, Rfl: 0    predniSONE (DELTASONE) 20 MG tablet, Take 1 tablet by mouth 2 (Two) Times a Day for 5 days., Disp: 10 tablet, Rfl: 0    traZODone (DESYREL) 100 MG tablet, Take 1 tablet by mouth., Disp: , Rfl:       Review of Systems   Constitutional:  Positive for activity change and fatigue. Negative for fever.   HENT:  Positive for congestion, postnasal drip, sinus pressure and voice change.    Respiratory:  Positive for cough and shortness of breath (from nasal congestion). Negative for wheezing.    Cardiovascular:  Negative for chest pain.   Gastrointestinal:  Negative for diarrhea, nausea and vomiting.   Musculoskeletal:  Positive for myalgias.   Neurological:  Negative for headache.        There were no vitals filed for this visit.    Objective   Physical Exam  Constitutional:       General: He is not in acute distress.     Appearance: He is ill-appearing. He is not toxic-appearing or  diaphoretic.   HENT:      Head: Normocephalic.      Nose: Congestion present.      Mouth/Throat:      Lips: Pink.      Mouth: Mucous membranes are moist.   Pulmonary:      Effort: Pulmonary effort is normal.   Neurological:      Mental Status: He is alert and oriented to person, place, and time.          Procedures     Assessment & Plan   Diagnoses and all orders for this visit:    1. Viral upper respiratory tract infection (Primary)  -     brompheniramine-pseudoephedrine-DM 30-2-10 MG/5ML syrup; Take 10 mL by mouth 4 (Four) Times a Day As Needed for Congestion, Allergies or Cough.  Dispense: 240 mL; Refill: 0  -     predniSONE (DELTASONE) 20 MG tablet; Take 1 tablet by mouth 2 (Two) Times a Day for 5 days.  Dispense: 10 tablet; Refill: 0  -     fluticasone (FLONASE) 50 MCG/ACT nasal spray; Administer 2 sprays into the nostril(s) as directed by provider Daily.  Dispense: 16 g; Refill: 0            PLAN: Discussed dosing, side effects, recommended other symptomatic care.  Patient should follow up with primary care provider, Urgent Care or ER if symptoms worsen, fail to resolve or other symptoms need attention. Patient/family agree to the above.         DEONDRE De La Vega     Mode of Visit: Video  Location of patient: -HOME-  Location of provider: +HOME+  You have chosen to receive care through a telehealth visit.  The patient has signed the video visit consent form.  The visit included audio and video interaction. No technical issues occurred during this visit.    The use of a video visit has been reviewed with the patient and verbal informed consent has been obtained. Myself and Swapnil Kyle participated in this visit. The patient is located at [address] [place] [address]. I am located in [place], [place]. Mychart and Zoom were utilized.        This visit was performed via Telehealth.  This patient has been instructed to follow-up with their primary care provider if their symptoms worsen or the  treatment provided does not resolve their illness.

## (undated) DEVICE — BLD SHAVER EXCALIBUR 4MM 13CM

## (undated) DEVICE — PK SHLDR ARTHSCP 60

## (undated) DEVICE — SUT VIC 3/0 SH 27IN J416H

## (undated) DEVICE — Device

## (undated) DEVICE — NDL HYPO ECLPS SFTY 18G 1 1/2IN

## (undated) DEVICE — PAD,ABDOMINAL,8"X10",ST,LF: Brand: MEDLINE

## (undated) DEVICE — PK MAJ PROC LF 60

## (undated) DEVICE — GLV SURG SENSICARE POLYISPRN W/ALOE PF LF 6 GRN STRL

## (undated) DEVICE — NDL SPINE 18G 31/2IN PNK

## (undated) DEVICE — STERILE POLYISOPRENE POWDER-FREE SURGICAL GLOVES WITH EMOLLIENT COATING: Brand: PROTEXIS

## (undated) DEVICE — GLV SURG SENSICARE PI PF LF 7 GRN STRL

## (undated) DEVICE — CANN TWST IN 7CM 8.25MM ID

## (undated) DEVICE — GLV SURG SENSICARE POLYISPRN W/ALOE PF LF 6.5 GRN STRL

## (undated) DEVICE — SPNG GZ WOVN 4X4IN 12PLY 10/BX STRL

## (undated) DEVICE — PATIENT RETURN ELECTRODE, SINGLE-USE, CONTACT QUALITY MONITORING, ADULT, WITH 9FT CORD, FOR PATIENTS WEIGING OVER 33LBS. (15KG): Brand: MEGADYNE

## (undated) DEVICE — GOWN,AURORA,NOREINF,RAGLAN,XL,STERILE: Brand: MEDLINE

## (undated) DEVICE — SUT VIC 3/0 TIES 18IN J110T

## (undated) DEVICE — SUT ETHLN 4/0 PS2 18IN 1667H

## (undated) DEVICE — SHOULDER SUSPENSION KIT 6 PER BOX

## (undated) DEVICE — SUT ETHLN 3-0 FS118IN 663H

## (undated) DEVICE — SLNG ULTRSLING3 13TO15IN LG

## (undated) DEVICE — PENCL ES MEGADINE EZ/CLEAN BUTN W/HOLSTR 10FT

## (undated) DEVICE — GLV SURG ORTHO BIOGEL OPTIFIT PF SZ9

## (undated) DEVICE — KT POSTN ARM TRIMANO BEACH CHR W/DRP

## (undated) DEVICE — GOWN,PREVENTION PLUS,XLONG/XLARGE,STRL: Brand: MEDLINE

## (undated) DEVICE — ABL ASP APOLLO RF XL 90D

## (undated) DEVICE — GLV SURG TRIUMPH LT PF LTX 7.5 STRL

## (undated) DEVICE — ANTIBACTERIAL UNDYED BRAIDED (POLYGLACTIN 910), SYNTHETIC ABSORBABLE SUTURE: Brand: COATED VICRYL

## (undated) DEVICE — GLV SURG TRIUMPH PF LTX 7 STRL

## (undated) DEVICE — NDL HYPO SFTY GLD 22G 1 1/2IN

## (undated) DEVICE — PAD GRND REM POLYHESIVE A/ DISP

## (undated) DEVICE — SPNG LAP 18X18IN LF STRL PK/5

## (undated) DEVICE — INTENDED FOR TISSUE SEPARATION, AND OTHER PROCEDURES THAT REQUIRE A SHARP SURGICAL BLADE TO PUNCTURE OR CUT.: Brand: BARD-PARKER ® CARBON RIB-BACK BLADES

## (undated) DEVICE — CANN TRPL DAM 7X7MM NO VLV

## (undated) DEVICE — ABL OPES COOLCUT ASPIR 3MM 90D

## (undated) DEVICE — GLV SURG TRIUMPH LT PF LTX 9 STRL

## (undated) DEVICE — T-MAX DISPOSABLE FACE MASK 8 PER BOX

## (undated) DEVICE — DRAPE,U/ SHT,SPLIT,PLAS,STERIL: Brand: MEDLINE

## (undated) DEVICE — PREP SOL POVIDONE/IODINE BT 4OZ

## (undated) DEVICE — NDL SUT CATGUT 1/2CIR TPR SZ3 2PK 1824-3D

## (undated) DEVICE — BLD SHAVER RESECTOR SERR AGGR 5MM 13CM

## (undated) DEVICE — SKIN AFFIX SURG ADHESIVE 72/CS 0.55ML: Brand: MEDLINE

## (undated) DEVICE — SYR LL TP 10ML STRL

## (undated) DEVICE — TBG PUMP ARTHSCP MAIN AR6400 16FT

## (undated) DEVICE — TP ELAS ELASTIKON ADHS 4IN 2.5YD

## (undated) DEVICE — LIGHT HANDLE: Brand: DEVON

## (undated) DEVICE — GLV SURG TRIUMPH LT PF LTX 6.5 STRL

## (undated) DEVICE — SUT VIC 2/0 SH 27IN

## (undated) DEVICE — GLV SURG SENSICARE GREEN W/ALOE PF LF 6.5 STRL

## (undated) DEVICE — SOL IRR NACL 0.9PCT BT 1000ML

## (undated) DEVICE — APPL CHLORAPREP W/TINT 26ML ORNG

## (undated) DEVICE — GLV SURG TRIUMPH LT PF LTX 6 STRL

## (undated) DEVICE — SYR LL 3CC

## (undated) DEVICE — SOL IRR LACT RNG BG 3000ML

## (undated) DEVICE — GLV SURG SENSICARE PI LF PF 7.5 GRN STRL

## (undated) DEVICE — TP SXN YANKR BLB TIP W/TBG 10F LF STRL

## (undated) DEVICE — STCKNT IMPERV 12IN STRL

## (undated) DEVICE — DRSNG GZ CURAD XEROFORM NONADHS 5X9IN STRL

## (undated) DEVICE — CONTAINER,SPECIMEN,OR STERILE,4OZ: Brand: MEDLINE

## (undated) DEVICE — GLV SURG TRIUMPH LT PF LTX 8 STRL